# Patient Record
Sex: FEMALE | Race: BLACK OR AFRICAN AMERICAN | NOT HISPANIC OR LATINO | Employment: FULL TIME | URBAN - METROPOLITAN AREA
[De-identification: names, ages, dates, MRNs, and addresses within clinical notes are randomized per-mention and may not be internally consistent; named-entity substitution may affect disease eponyms.]

---

## 2022-11-09 ENCOUNTER — APPOINTMENT (OUTPATIENT)
Dept: GENERAL RADIOLOGY | Facility: CLINIC | Age: 27
End: 2022-11-09
Attending: EMERGENCY MEDICINE
Payer: COMMERCIAL

## 2022-11-09 ENCOUNTER — HOSPITAL ENCOUNTER (OUTPATIENT)
Facility: CLINIC | Age: 27
Setting detail: OBSERVATION
Discharge: HOME | End: 2022-11-12
Attending: EMERGENCY MEDICINE | Admitting: HOSPITALIST
Payer: COMMERCIAL

## 2022-11-09 DIAGNOSIS — J10.1 INFLUENZA A: ICD-10-CM

## 2022-11-09 DIAGNOSIS — I51.4 MYOCARDITIS, UNSPECIFIED CHRONICITY, UNSPECIFIED MYOCARDITIS TYPE (H): ICD-10-CM

## 2022-11-09 LAB
ALBUMIN SERPL-MCNC: 3.2 G/DL (ref 3.4–5)
ALP SERPL-CCNC: 57 U/L (ref 40–150)
ALT SERPL W P-5'-P-CCNC: 26 U/L (ref 0–50)
ANION GAP SERPL CALCULATED.3IONS-SCNC: 9 MMOL/L (ref 3–14)
AST SERPL W P-5'-P-CCNC: 37 U/L (ref 0–45)
ATRIAL RATE - MUSE: 83 BPM
BASOPHILS # BLD AUTO: 0 10E3/UL (ref 0–0.2)
BASOPHILS NFR BLD AUTO: 0 %
BILIRUB SERPL-MCNC: 0.9 MG/DL (ref 0.2–1.3)
BUN SERPL-MCNC: 17 MG/DL (ref 7–30)
CALCIUM SERPL-MCNC: 8.8 MG/DL (ref 8.5–10.1)
CHLORIDE BLD-SCNC: 100 MMOL/L (ref 94–109)
CO2 SERPL-SCNC: 22 MMOL/L (ref 20–32)
CREAT SERPL-MCNC: 0.96 MG/DL (ref 0.52–1.04)
DIASTOLIC BLOOD PRESSURE - MUSE: NORMAL MMHG
EOSINOPHIL # BLD AUTO: 0 10E3/UL (ref 0–0.7)
EOSINOPHIL NFR BLD AUTO: 0 %
ERYTHROCYTE [DISTWIDTH] IN BLOOD BY AUTOMATED COUNT: 14.6 % (ref 10–15)
FLUAV RNA SPEC QL NAA+PROBE: POSITIVE
FLUBV RNA RESP QL NAA+PROBE: NEGATIVE
GFR SERPL CREATININE-BSD FRML MDRD: 83 ML/MIN/1.73M2
GLUCOSE BLD-MCNC: 113 MG/DL (ref 70–99)
HCT VFR BLD AUTO: 42.3 % (ref 35–47)
HGB BLD-MCNC: 14.1 G/DL (ref 11.7–15.7)
IMM GRANULOCYTES # BLD: 0 10E3/UL
IMM GRANULOCYTES NFR BLD: 0 %
INTERPRETATION ECG - MUSE: NORMAL
LYMPHOCYTES # BLD AUTO: 1.7 10E3/UL (ref 0.8–5.3)
LYMPHOCYTES NFR BLD AUTO: 21 %
MCH RBC QN AUTO: 26.9 PG (ref 26.5–33)
MCHC RBC AUTO-ENTMCNC: 33.3 G/DL (ref 31.5–36.5)
MCV RBC AUTO: 81 FL (ref 78–100)
MONOCYTES # BLD AUTO: 0.6 10E3/UL (ref 0–1.3)
MONOCYTES NFR BLD AUTO: 7 %
NEUTROPHILS # BLD AUTO: 5.6 10E3/UL (ref 1.6–8.3)
NEUTROPHILS NFR BLD AUTO: 72 %
NRBC # BLD AUTO: 0 10E3/UL
NRBC BLD AUTO-RTO: 0 /100
NT-PROBNP SERPL-MCNC: 36 PG/ML (ref 0–450)
P AXIS - MUSE: 56 DEGREES
PLATELET # BLD AUTO: 270 10E3/UL (ref 150–450)
POTASSIUM BLD-SCNC: 3.8 MMOL/L (ref 3.4–5.3)
PR INTERVAL - MUSE: 172 MS
PROT SERPL-MCNC: 7.8 G/DL (ref 6.8–8.8)
QRS DURATION - MUSE: 84 MS
QT - MUSE: 392 MS
QTC - MUSE: 460 MS
R AXIS - MUSE: 75 DEGREES
RBC # BLD AUTO: 5.25 10E6/UL (ref 3.8–5.2)
RSV RNA SPEC NAA+PROBE: NEGATIVE
SARS-COV-2 RNA RESP QL NAA+PROBE: NEGATIVE
SODIUM SERPL-SCNC: 131 MMOL/L (ref 133–144)
SYSTOLIC BLOOD PRESSURE - MUSE: NORMAL MMHG
T AXIS - MUSE: -20 DEGREES
TROPONIN I SERPL HS-MCNC: 73 NG/L
TROPONIN I SERPL HS-MCNC: 88 NG/L
VENTRICULAR RATE- MUSE: 83 BPM
WBC # BLD AUTO: 7.8 10E3/UL (ref 4–11)

## 2022-11-09 PROCEDURE — 84484 ASSAY OF TROPONIN QUANT: CPT | Performed by: EMERGENCY MEDICINE

## 2022-11-09 PROCEDURE — C9803 HOPD COVID-19 SPEC COLLECT: HCPCS

## 2022-11-09 PROCEDURE — 83880 ASSAY OF NATRIURETIC PEPTIDE: CPT | Performed by: EMERGENCY MEDICINE

## 2022-11-09 PROCEDURE — G0378 HOSPITAL OBSERVATION PER HR: HCPCS

## 2022-11-09 PROCEDURE — 96361 HYDRATE IV INFUSION ADD-ON: CPT

## 2022-11-09 PROCEDURE — 250N000011 HC RX IP 250 OP 636: Performed by: EMERGENCY MEDICINE

## 2022-11-09 PROCEDURE — 250N000013 HC RX MED GY IP 250 OP 250 PS 637: Performed by: EMERGENCY MEDICINE

## 2022-11-09 PROCEDURE — 96374 THER/PROPH/DIAG INJ IV PUSH: CPT

## 2022-11-09 PROCEDURE — 36415 COLL VENOUS BLD VENIPUNCTURE: CPT | Performed by: EMERGENCY MEDICINE

## 2022-11-09 PROCEDURE — 71046 X-RAY EXAM CHEST 2 VIEWS: CPT

## 2022-11-09 PROCEDURE — 87637 SARSCOV2&INF A&B&RSV AMP PRB: CPT | Performed by: EMERGENCY MEDICINE

## 2022-11-09 PROCEDURE — 80053 COMPREHEN METABOLIC PANEL: CPT | Performed by: EMERGENCY MEDICINE

## 2022-11-09 PROCEDURE — 99285 EMERGENCY DEPT VISIT HI MDM: CPT | Mod: 25

## 2022-11-09 PROCEDURE — 99220 PR INITIAL OBSERVATION CARE,LEVEL III: CPT | Performed by: HOSPITALIST

## 2022-11-09 PROCEDURE — 85025 COMPLETE CBC W/AUTO DIFF WBC: CPT | Performed by: EMERGENCY MEDICINE

## 2022-11-09 PROCEDURE — 93005 ELECTROCARDIOGRAM TRACING: CPT

## 2022-11-09 PROCEDURE — 258N000003 HC RX IP 258 OP 636: Performed by: EMERGENCY MEDICINE

## 2022-11-09 RX ORDER — ASPIRIN 81 MG/1
162 TABLET, CHEWABLE ORAL ONCE
Status: COMPLETED | OUTPATIENT
Start: 2022-11-09 | End: 2022-11-09

## 2022-11-09 RX ORDER — SODIUM CHLORIDE 9 MG/ML
INJECTION, SOLUTION INTRAVENOUS CONTINUOUS
Status: DISCONTINUED | OUTPATIENT
Start: 2022-11-09 | End: 2022-11-12 | Stop reason: HOSPADM

## 2022-11-09 RX ORDER — ONDANSETRON 2 MG/ML
4 INJECTION INTRAMUSCULAR; INTRAVENOUS ONCE
Status: COMPLETED | OUTPATIENT
Start: 2022-11-09 | End: 2022-11-09

## 2022-11-09 RX ORDER — ACETAMINOPHEN 500 MG
1000 TABLET ORAL ONCE
Status: COMPLETED | OUTPATIENT
Start: 2022-11-09 | End: 2022-11-09

## 2022-11-09 RX ADMIN — ASPIRIN 81 MG CHEWABLE TABLET 162 MG: 81 TABLET CHEWABLE at 21:21

## 2022-11-09 RX ADMIN — ACETAMINOPHEN 1000 MG: 500 TABLET ORAL at 19:50

## 2022-11-09 RX ADMIN — ONDANSETRON 4 MG: 2 INJECTION INTRAMUSCULAR; INTRAVENOUS at 19:50

## 2022-11-09 RX ADMIN — SODIUM CHLORIDE 125 ML/HR: 9 INJECTION, SOLUTION INTRAVENOUS at 21:27

## 2022-11-09 RX ADMIN — SODIUM CHLORIDE 1000 ML: 9 INJECTION, SOLUTION INTRAVENOUS at 19:45

## 2022-11-09 ASSESSMENT — ACTIVITIES OF DAILY LIVING (ADL): ADLS_ACUITY_SCORE: 35

## 2022-11-10 ENCOUNTER — APPOINTMENT (OUTPATIENT)
Dept: CARDIOLOGY | Facility: CLINIC | Age: 27
End: 2022-11-10
Attending: HOSPITALIST
Payer: COMMERCIAL

## 2022-11-10 LAB
ANION GAP SERPL CALCULATED.3IONS-SCNC: 5 MMOL/L (ref 3–14)
BUN SERPL-MCNC: 12 MG/DL (ref 7–30)
CALCIUM SERPL-MCNC: 7.8 MG/DL (ref 8.5–10.1)
CHLORIDE BLD-SCNC: 110 MMOL/L (ref 94–109)
CO2 SERPL-SCNC: 23 MMOL/L (ref 20–32)
CREAT SERPL-MCNC: 0.8 MG/DL (ref 0.52–1.04)
ERYTHROCYTE [DISTWIDTH] IN BLOOD BY AUTOMATED COUNT: 14.8 % (ref 10–15)
GFR SERPL CREATININE-BSD FRML MDRD: >90 ML/MIN/1.73M2
GLUCOSE BLD-MCNC: 92 MG/DL (ref 70–99)
HCG UR QL: NEGATIVE
HCT VFR BLD AUTO: 34.3 % (ref 35–47)
HGB BLD-MCNC: 11.6 G/DL (ref 11.7–15.7)
LVEF ECHO: NORMAL
MCH RBC QN AUTO: 27.4 PG (ref 26.5–33)
MCHC RBC AUTO-ENTMCNC: 33.8 G/DL (ref 31.5–36.5)
MCV RBC AUTO: 81 FL (ref 78–100)
PLATELET # BLD AUTO: 180 10E3/UL (ref 150–450)
POTASSIUM BLD-SCNC: 3.7 MMOL/L (ref 3.4–5.3)
RBC # BLD AUTO: 4.24 10E6/UL (ref 3.8–5.2)
SODIUM SERPL-SCNC: 138 MMOL/L (ref 133–144)
TROPONIN I SERPL HS-MCNC: 51 NG/L
WBC # BLD AUTO: 6.8 10E3/UL (ref 4–11)

## 2022-11-10 PROCEDURE — G0378 HOSPITAL OBSERVATION PER HR: HCPCS

## 2022-11-10 PROCEDURE — 250N000009 HC RX 250: Performed by: HOSPITALIST

## 2022-11-10 PROCEDURE — 81025 URINE PREGNANCY TEST: CPT | Performed by: HOSPITALIST

## 2022-11-10 PROCEDURE — 999N000208 ECHOCARDIOGRAM COMPLETE

## 2022-11-10 PROCEDURE — 93306 TTE W/DOPPLER COMPLETE: CPT | Mod: 26 | Performed by: INTERNAL MEDICINE

## 2022-11-10 PROCEDURE — 82310 ASSAY OF CALCIUM: CPT | Performed by: HOSPITALIST

## 2022-11-10 PROCEDURE — 255N000002 HC RX 255 OP 636: Performed by: EMERGENCY MEDICINE

## 2022-11-10 PROCEDURE — 36415 COLL VENOUS BLD VENIPUNCTURE: CPT | Performed by: HOSPITALIST

## 2022-11-10 PROCEDURE — 85027 COMPLETE CBC AUTOMATED: CPT | Performed by: HOSPITALIST

## 2022-11-10 PROCEDURE — 250N000013 HC RX MED GY IP 250 OP 250 PS 637: Performed by: HOSPITALIST

## 2022-11-10 PROCEDURE — 99204 OFFICE O/P NEW MOD 45 MIN: CPT | Mod: 25 | Performed by: INTERNAL MEDICINE

## 2022-11-10 PROCEDURE — 99225 PR SUBSEQUENT OBSERVATION CARE,LEVEL II: CPT | Performed by: HOSPITALIST

## 2022-11-10 PROCEDURE — 84484 ASSAY OF TROPONIN QUANT: CPT | Performed by: HOSPITALIST

## 2022-11-10 PROCEDURE — 258N000003 HC RX IP 258 OP 636: Performed by: EMERGENCY MEDICINE

## 2022-11-10 RX ORDER — ONDANSETRON 2 MG/ML
4 INJECTION INTRAMUSCULAR; INTRAVENOUS EVERY 6 HOURS PRN
Status: DISCONTINUED | OUTPATIENT
Start: 2022-11-10 | End: 2022-11-12 | Stop reason: HOSPADM

## 2022-11-10 RX ORDER — ACETAMINOPHEN 650 MG/1
650 SUPPOSITORY RECTAL EVERY 6 HOURS PRN
Status: DISCONTINUED | OUTPATIENT
Start: 2022-11-10 | End: 2022-11-12 | Stop reason: HOSPADM

## 2022-11-10 RX ORDER — LANOLIN ALCOHOL/MO/W.PET/CERES
3 CREAM (GRAM) TOPICAL
Status: DISCONTINUED | OUTPATIENT
Start: 2022-11-10 | End: 2022-11-12 | Stop reason: HOSPADM

## 2022-11-10 RX ORDER — OSELTAMIVIR PHOSPHATE 75 MG/1
75 CAPSULE ORAL 2 TIMES DAILY
Status: DISCONTINUED | OUTPATIENT
Start: 2022-11-10 | End: 2022-11-12 | Stop reason: HOSPADM

## 2022-11-10 RX ORDER — ONDANSETRON 4 MG/1
4 TABLET, ORALLY DISINTEGRATING ORAL EVERY 6 HOURS PRN
Status: DISCONTINUED | OUTPATIENT
Start: 2022-11-10 | End: 2022-11-12 | Stop reason: HOSPADM

## 2022-11-10 RX ORDER — IPRATROPIUM BROMIDE AND ALBUTEROL SULFATE 2.5; .5 MG/3ML; MG/3ML
3 SOLUTION RESPIRATORY (INHALATION)
Status: DISCONTINUED | OUTPATIENT
Start: 2022-11-10 | End: 2022-11-12 | Stop reason: HOSPADM

## 2022-11-10 RX ORDER — CODEINE PHOSPHATE AND GUAIFENESIN 10; 100 MG/5ML; MG/5ML
5 SOLUTION ORAL EVERY 4 HOURS PRN
Status: DISCONTINUED | OUTPATIENT
Start: 2022-11-10 | End: 2022-11-12 | Stop reason: HOSPADM

## 2022-11-10 RX ORDER — ACETAMINOPHEN 325 MG/1
650 TABLET ORAL EVERY 6 HOURS PRN
Status: DISCONTINUED | OUTPATIENT
Start: 2022-11-10 | End: 2022-11-12 | Stop reason: HOSPADM

## 2022-11-10 RX ADMIN — IPRATROPIUM BROMIDE AND ALBUTEROL SULFATE 3 ML: .5; 3 SOLUTION RESPIRATORY (INHALATION) at 12:48

## 2022-11-10 RX ADMIN — OSELTAMIVIR PHOSPHATE 75 MG: 75 CAPSULE ORAL at 08:07

## 2022-11-10 RX ADMIN — ACETAMINOPHEN 650 MG: 325 TABLET ORAL at 14:05

## 2022-11-10 RX ADMIN — GUAIFENESIN AND CODEINE PHOSPHATE 5 ML: 100; 10 SOLUTION ORAL at 20:16

## 2022-11-10 RX ADMIN — GUAIFENESIN AND CODEINE PHOSPHATE 5 ML: 100; 10 SOLUTION ORAL at 14:05

## 2022-11-10 RX ADMIN — IPRATROPIUM BROMIDE AND ALBUTEROL SULFATE 3 ML: .5; 3 SOLUTION RESPIRATORY (INHALATION) at 07:56

## 2022-11-10 RX ADMIN — OSELTAMIVIR PHOSPHATE 75 MG: 75 CAPSULE ORAL at 20:16

## 2022-11-10 RX ADMIN — SODIUM CHLORIDE: 9 INJECTION, SOLUTION INTRAVENOUS at 01:47

## 2022-11-10 RX ADMIN — IPRATROPIUM BROMIDE AND ALBUTEROL SULFATE 3 ML: .5; 3 SOLUTION RESPIRATORY (INHALATION) at 21:32

## 2022-11-10 RX ADMIN — IPRATROPIUM BROMIDE AND ALBUTEROL SULFATE 3 ML: .5; 3 SOLUTION RESPIRATORY (INHALATION) at 17:52

## 2022-11-10 RX ADMIN — ACETAMINOPHEN 650 MG: 325 TABLET ORAL at 20:16

## 2022-11-10 RX ADMIN — HUMAN ALBUMIN MICROSPHERES AND PERFLUTREN 3 ML: 10; .22 INJECTION, SOLUTION INTRAVENOUS at 11:19

## 2022-11-10 ASSESSMENT — ACTIVITIES OF DAILY LIVING (ADL)
ADLS_ACUITY_SCORE: 33
ADLS_ACUITY_SCORE: 35
ADLS_ACUITY_SCORE: 35
ADLS_ACUITY_SCORE: 33
ADLS_ACUITY_SCORE: 35
ADLS_ACUITY_SCORE: 33
ADLS_ACUITY_SCORE: 33
ADLS_ACUITY_SCORE: 35
ADLS_ACUITY_SCORE: 33
ADLS_ACUITY_SCORE: 35

## 2022-11-10 NOTE — ED PROVIDER NOTES
Rapid Assessment Note    History:   Jerman Toussaint is a 27 year old female who presents with 3 to 4 days of high fevers, cough and body aches.  Was seen in urgent care, was tested for COVID, and sent home.  Her kids have similar symptoms.  Notes continued cough and rib pain, was told to come by triage line.    Exam:   General:  Alert, interactive  Cardiovascular:  Well perfused  Lungs:  No respiratory distress, no accessory muscle use  Neuro:  Moving all 4 extremities  Skin:  Warm, dry  Psych:  Normal affect      Plan of Care:   I evaluated the patient and developed an initial plan of care. I discussed this plan and explained that I, or one of my partners, would be returning to complete the evaluation.         I, Rupert Villanueva MD, am serving as a scribe to document services personally performed by No att. providers found, based on my observations and the provider's statements to me.    11/9/2022  [employer] [employer]    Portions of this medical record were completed by a scribe. UPON MY REVIEW AND AUTHENTICATION BY ELECTRONIC SIGNATURE, this confirms (a) I performed the applicable clinical services, and (b) the record is accurate.        Rupert Villanueva MD  11/09/22 1938

## 2022-11-10 NOTE — PROGRESS NOTES
[facility]    Medicine Progress Note - Hospitalist Service    Date of Admission:  11/9/2022    Assessment & Plan     Jerman Toussaint is a 27 year old female with a past medical history of asthma presents to hospital with palpitations     Influenza A  Patient reports symptoms started on Sunday.  She was febrile in the ER.  Excision saturation stable on room air.  Given her asthma and now myocarditis I will treat her with tamiflu.  - Tylenol as needed.  Avoid NSAIDs due to myocarditis.  - Oseltamivir 75bid for 5 days  - PRN Antitussives.     Probable Myocarditis  Patient reporting palpitations and symptoms of heart racing with minimal exertion and when lying down flat.  Denies any lower extremity swelling.  EKG with diffuse T wave inversions.  Troponin I initially elevated to 88 and repeat came back at 73.  In setting of viral infection myocarditis is suspected.  Case was discussed with cardiology by the ER provider and she will be admitted for echocardiogram and cardiology evaluation.  On exam and based on BMP the patient is not in heart failure.   - cardiology consult; input appreciated.  - contiunue to monitor on telemetry  - echocardiogram - preserved EF 60 - 65%, no WMA, ventricular diastolic function is  Abnormal  -Cardiac MRI pending.     Hyponatremia - resolved  Mild.  Likely secondary to acute illness and poor p.o. intake.  -Monitor     Diet: Regular Diet Adult    DVT Prophylaxis: Pneumatic Compression Devices  Daniel Catheter: Not present  Central Lines: None  Cardiac Monitoring: None  Code Status: Full Code      Disposition Plan      Expected Discharge Date: 11/11/2022                The patient's care was discussed with the Patient.    Leti Araya MD  Hospitalist Service  [facility]  Securely message with the Vocera Web Console (learn more here)  Text page via Maintenance Assistant Paging/Directory         Clinically Significant Risk Factors Present on Admission          # Hyponatremia: Lowest Na = 131 mmol/L (Ref range: 136-145) in last 2 days, will monitor as appropriate  # Hypocalcemia: Lowest Ca = 7.8 mg/dL (Ref range: 8.5 - 10.1 mg/dL) in last 2 days, will monitor and replace as appropriate     # Hypoalbuminemia: Lowest albumin = 3.2 g/dL (Ref range: 3.5-5.2) at 11/9/2022  7:44 PM, will monitor as appropriate                 ______________________________________________________________________    Interval History   Patient sitting on the edge of the bed, coughing continuously.  Not able to speak much.        Data reviewed today: I reviewed all medications, new labs and imaging results over the last 24 hours. I personally reviewed no images or EKG's today.    Physical Exam   Vital Signs: Temp: (!) 100.6  F (38.1  C) Temp src: Oral BP: 106/75 Pulse: 71   Resp: 20 SpO2: 97 % O2 Device: None (Room air)    Weight: 200 lbs 0 oz  General Appearance: Well appearing for stated age.  Respiratory: CTAB, no rales or ronchi  Cardiovascular: S1, S2 normal, no murmurs  GI: non-tender on palpation, BS present      Data   Recent Labs   Lab 11/10/22  0618 11/09/22  1944   WBC 6.8 7.8   HGB 11.6* 14.1   MCV 81 81    270    131*   POTASSIUM 3.7 3.8   CHLORIDE 110* 100   CO2 23 22   BUN 12 17   CR 0.80 0.96   ANIONGAP 5 9   NINO 7.8* 8.8   GLC 92 113*   ALBUMIN  --  3.2*   PROTTOTAL  --  7.8   BILITOTAL  --  0.9   ALKPHOS  --  57   ALT  --  26   AST  --  37     Recent Results (from the past 24 hour(s))   XR Chest 2 Views    Narrative    EXAM: XR CHEST 2 VIEWS  LOCATION: [facility]  DATE/TIME: 11/9/2022 8:48 PM    INDICATION: Shortness of breath.  COMPARISON: None.      Impression    IMPRESSION: Negative chest.   Echocardiogram Complete   Result Value    LVEF  60-65%    Narrative    [id number]  [id number]  [id number]^EDVIN^MARI^HEMZBIGNIEW     [facility]  Echocardiography Laboratory  [address] [place] [id number]     Name:  PJ LAO  MRN: [id number]  : 1995  Study Date: 11/10/2022 10:48 AM  Age: 27 yrs  Gender: Female  Patient Location: [facility]  Reason For Study: Myocarditis  Ordering Physician: MARI PARDO  Referring Physician: MARI PARDO  Performed By: JAVIER Guevara     BSA: 2.1 m2  Height: 71 in  Weight: 200 lb  HR: 75  BP: 106/75 mmHg  ______________________________________________________________________________  Procedure  Complete Portable Echo Adult. Optison (NDC #[id number]) given intravenously.  ______________________________________________________________________________  Interpretation Summary     Left ventricular systolic function is normal.  The visual ejection fraction is 60-65%.  Normal left ventricular wall motion  The right ventricle is normal in structure, function and size.  No significant valve dysfunction.  The inferior vena cava was normal in size with preserved respiratory  variability.  There is no pericardial effusion.     No prior study for comparison.  ______________________________________________________________________________  Left Ventricle  The left ventricle is normal in structure, function and size. There is normal  left ventricular wall thickness. Left ventricular systolic function is normal.  The visual ejection fraction is 60-65%. Left ventricular diastolic function is  abnormal. Normal left ventricular wall motion.     Right Ventricle  The right ventricle is normal in structure, function and size.     Atria  Normal left atrial size. Right atrial size is normal.     Mitral Valve  The mitral valve is normal in structure and function.     Tricuspid Valve  The tricuspid valve is normal in structure and function. Right ventricular  systolic pressure could not be approximated due to inadequate tricuspid  regurgitation.     Aortic Valve  The aortic valve is normal in structure and function.     Pulmonic Valve  The pulmonic valve is normal in structure and  function.     Vessels  The aortic root is normal size. Normal size ascending aorta. The inferior vena  cava was normal in size with preserved respiratory variability.     Pericardium  There is no pericardial effusion.     ______________________________________________________________________________  MMode/2D Measurements & Calculations  IVSd: 0.97 cm  LVIDd: 4.7 cm  LVIDs: 3.0 cm  LVPWd: 0.74 cm  FS: 35.7 %  LV mass(C)d: 133.8 grams  LV mass(C)dI: 63.4 grams/m2     Ao root diam: 3.2 cm  asc Aorta Diam: 3.5 cm  LVOT diam: 1.9 cm  LVOT area: 2.9 cm2  LA Volume (BP): 31.2 ml  LA Volume Index (BP): 14.8 ml/m2     LA Volume Indexed (AL/bp): 15.1 ml/m2  RWT: 0.32     Doppler Measurements & Calculations  MV E max bogdan: 88.6 cm/sec  MV A max bogdan: 87.5 cm/sec  MV E/A: 1.0  MV dec time: 0.21 sec  PA acc time: 0.14 sec  E/E' av.1  Lateral E/e': 10.1  Medial E/e': 8.1     ______________________________________________________________________________  Report approved by: Aguilar Brown 11/10/2022 11:53 AM

## 2022-11-10 NOTE — ED NOTES
Ed   [facility]  ED Nurse Handoff Report    ED Chief complaint: Palpitations and Shortness of Breath      ED Diagnosis: myocarditis  Final diagnoses:   Myocarditis, unspecified chronicity, unspecified myocarditis type (H)   Influenza A       Code Status: Discuss with admit MD    Allergies: No Known Allergies    Patient Story: Jerman Toussaint is a 27 year old female who presents with 3 to 4 days of high fevers, cough and body aches.  Was seen in urgent care, was tested for COVID, and sent home.  Her kids have similar symptoms.  Notes continued cough and rib pain, was told to come by triage line.  Focused Assessment:  gcs 15 alert oriented no headache   Lungs: sob on exertion and whezzes throughout  Cvs: s1s2 twi globally on ekg  Abdo: previous nvd none now no abdo pain  Msk: c/o sharp bilateal side pain    Treatments and/or interventions provided:   Results for orders placed or performed during the hospital encounter of 11/09/22   XR Chest 2 Views     Status: None    Narrative    EXAM: XR CHEST 2 VIEWS  LOCATION: [facility]  DATE/TIME: 11/9/2022 8:48 PM    INDICATION: Shortness of breath.  COMPARISON: None.      Impression    IMPRESSION: Negative chest.   Comprehensive metabolic panel     Status: Abnormal   Result Value Ref Range    Sodium 131 (L) 133 - 144 mmol/L    Potassium 3.8 3.4 - 5.3 mmol/L    Chloride 100 94 - 109 mmol/L    Carbon Dioxide (CO2) 22 20 - 32 mmol/L    Anion Gap 9 3 - 14 mmol/L    Urea Nitrogen 17 7 - 30 mg/dL    Creatinine 0.96 0.52 - 1.04 mg/dL    Calcium 8.8 8.5 - 10.1 mg/dL    Glucose 113 (H) 70 - 99 mg/dL    Alkaline Phosphatase 57 40 - 150 U/L    AST 37 0 - 45 U/L    ALT 26 0 - 50 U/L    Protein Total 7.8 6.8 - 8.8 g/dL    Albumin 3.2 (L) 3.4 - 5.0 g/dL    Bilirubin Total 0.9 0.2 - 1.3 mg/dL    GFR Estimate 83 >60 mL/min/1.73m2   Troponin I     Status: Abnormal   Result Value Ref Range    Troponin I High Sensitivity 88 (H) <54 ng/L    Symptomatic; Unknown Influenza A/B & SARS-CoV2 (COVID-19) Virus PCR Multiplex Nasopharyngeal     Status: Abnormal    Specimen: Nasopharyngeal; Swab   Result Value Ref Range    Influenza A PCR Positive (A) Negative    Influenza B PCR Negative Negative    RSV PCR Negative Negative    SARS CoV2 PCR Negative Negative    Narrative    Testing was performed using the Xpert Xpress CoV2/Flu/RSV Assay on the [employer] GeneXpert Instrument. This test should be ordered for the detection of SARS-CoV-2 and influenza viruses in individuals who meet clinical and/or epidemiological criteria. Test performance is unknown in asymptomatic patients. This test is for in vitro diagnostic use under the FDA EUA for laboratories certified under CLIA to perform high or moderate complexity testing. This test has not been FDA cleared or approved. A negative result does not rule out the presence of PCR inhibitors in the specimen or target RNA in concentration below the limit of detection for the assay. If only one viral target is positive but coinfection with multiple targets is suspected, the sample should be re-tested with another FDA cleared, approved, or authorized test, if coinfection would change clinical management. This test was validated by the [facility] [employer]. These laboratories are certified under the Clinical Laboratory Improvement Amendments of 1988 (CLIA-88) as qualified to perform high complexity laboratory testing.   CBC with platelets and differential     Status: Abnormal   Result Value Ref Range    WBC Count 7.8 4.0 - 11.0 10e3/uL    RBC Count 5.25 (H) 3.80 - 5.20 10e6/uL    Hemoglobin 14.1 11.7 - 15.7 g/dL    Hematocrit 42.3 35.0 - 47.0 %    MCV 81 78 - 100 fL    MCH 26.9 26.5 - 33.0 pg    MCHC 33.3 31.5 - 36.5 g/dL    RDW 14.6 10.0 - 15.0 %    Platelet Count 270 150 - 450 10e3/uL    % Neutrophils 72 %    % Lymphocytes 21 %    % Monocytes 7 %    % Eosinophils 0 %    % Basophils 0 %    % Immature Granulocytes 0 %     NRBCs per 100 WBC 0 <1 /100    Absolute Neutrophils 5.6 1.6 - 8.3 10e3/uL    Absolute Lymphocytes 1.7 0.8 - 5.3 10e3/uL    Absolute Monocytes 0.6 0.0 - 1.3 10e3/uL    Absolute Eosinophils 0.0 0.0 - 0.7 10e3/uL    Absolute Basophils 0.0 0.0 - 0.2 10e3/uL    Absolute Immature Granulocytes 0.0 <=0.4 10e3/uL    Absolute NRBCs 0.0 10e3/uL   Troponin I     Status: Abnormal   Result Value Ref Range    Troponin I High Sensitivity 73 (H) <54 ng/L   EKG 12-lead, tracing only     Status: None (Preliminary result)   Result Value Ref Range    Systolic Blood Pressure  mmHg    Diastolic Blood Pressure  mmHg    Ventricular Rate 83 BPM    Atrial Rate 83 BPM    NJ Interval 172 ms    QRS Duration 84 ms     ms    QTc 460 ms    P Axis 56 degrees    R AXIS 75 degrees    T Axis -20 degrees    Interpretation ECG       Sinus rhythm with sinus arrhythmia  T wave abnormality, consider inferior ischemia  T wave abnormality, consider anterolateral ischemia  Prolonged QT  Abnormal ECG  No previous ECGs available     CBC with platelets differential     Status: Abnormal    Narrative    The following orders were created for panel order CBC with platelets differential.  Procedure                               Abnormality         Status                     ---------                               -----------         ------                     CBC with platelets and d...[[id number]]  Abnormal            Final result                 Please view results for these tests on the individual orders.     Patient's response to treatments and/or interventions: telemetry serial trops    To be done/followed up on inpatient unit:  serial trops and cp    Does this patient have any cognitive concerns?: denies    Activity level - Baseline/Home:  Independent  Activity Level - Current:   Independent    Patient's Preferred language: English   Needed?: No    Isolation: None  Infection: Not Applicable  Patient tested for COVID 19 prior to admission:  YES  Bariatric?: No    Vital Signs:   Vitals:    11/09/22 2115 11/09/22 2121 11/09/22 2130 11/09/22 2145   BP: 116/77  115/71    Pulse:  92 89 87   Resp:   23 24   Temp:  (!) 100.6  F (38.1  C)     TempSrc:       SpO2:   97% 98%   Weight:           Cardiac Rhythm:     Was the PSS-3 completed:   Yes  What interventions are required if any?               Family Comments: na  OBS brochure/video discussed/provided to patient/family: Yes              Name of person given brochure if not patient: na              Relationship to patient: na    For the majority of the shift this patient's behavior was Green.   Behavioral interventions performed were na.    ED NURSE PHONE NUMBER: [phone]

## 2022-11-10 NOTE — CONSULTS
CARDIOLOGY CONSULT    REASON FOR CONSULT: positive troponin     PRIMARY CARE PHYSICIAN:  Physician No Ref-Primary    HISTORY OF PRESENT ILLNESS:  Ms. Jerman Toussaint is a 27 year old woman with PMH significant for asthma who presents for evaluation of tachycardia, fever, fatigue.  Jerman states that she has been feeling poorly since Sunday with nausea/vomiting and diarrhea as well as generalized fatigue, body aches, shortness of breath, cough.  She states over the past couple of days she has noted tachycardia with minimal activity and when laying down.  She notes significant fatigue with activity and some shortness of breath.  She has a cough.  She initially went to urgent care and tested positive for influemza A.  Due to ongoing concerns regarding tachycardia she presented to the ED.  She was noted to be febrile. ECG demonstrates sinus tachycardia with T wave inversions in the anterolateral leads.  Her troponin was minimally positive at 88 and then 73.           PAST MEDICAL HISTORY:  1.  Asthma    MEDICATIONS:  Current Facility-Administered Medications   Medication     acetaminophen (TYLENOL) tablet 650 mg    Or     acetaminophen (TYLENOL) Suppository 650 mg     ipratropium - albuterol 0.5 mg/2.5 mg/3 mL (DUONEB) neb solution 3 mL     melatonin tablet 3 mg     ondansetron (ZOFRAN ODT) ODT tab 4 mg    Or     ondansetron (ZOFRAN) injection 4 mg     oseltamivir (TAMIFLU) capsule 75 mg     sodium chloride (PF) 0.9% PF flush 10 mL     sodium chloride 0.9% infusion       ALLERGIES:  No Known Allergies    SOCIAL HISTORY:  I have reviewed this patient's social history and updated it with pertinent information if needed. Jerman Toussaint  reports that she does not currently use alcohol.    FAMILY HISTORY:  I have reviewed this patient's family history and updated it with pertinent information if needed.   Family History   Problem Relation Age of Onset     Hypertension Mother      Asthma Mother      Hypertension  Father      Heart Disease Maternal Grandmother      Coronary Artery Disease Maternal Grandfather        REVIEW OF SYSTEMS:  A complete ROS was obtained and the pertinent positives are outlined in the history of present illness above.  The remainder of systems is negative.      PHYSICAL EXAM:  Temp: (!) 100.6  F (38.1  C) Temp src: Oral BP: 106/75 Pulse: 71   Resp: 20 SpO2: 97 % O2 Device: None (Room air)    Vital Signs with Ranges  Temp:  [100.6  F (38.1  C)-103.1  F (39.5  C)] 100.6  F (38.1  C)  Pulse:  [] 71  Resp:  [18-24] 20  BP: (106-128)/(71-84) 106/75  SpO2:  [96 %-98 %] 97 %  200 lbs 0 oz    Constitutional: awake, alert, no distress  Eyes: PERRL, sclera nonicteric  ENT: trachea midline  Respiratory: CTAB  Cardiovascular: RRR no m/r/g, no JVD  GI: nondistended, nontender, bowel sounds present  Lymph/Hematologic: no lymphadenopathy  Skin: dry, no rash  Musculoskeletal: good muscle tone, no edema bilaterally  Neurologic: no focal deficits  Neuropsychiatric: appropriate affact    DATA:  Labs:   Troponin 88-73    EKG: Dated 11/9/22 reviewed personally.  Sinus rhythm with diffuse T wave inversions    Echocardiogram dated 11/10/22 images reviewed personally   Left ventricular systolic function is normal.  The visual ejection fraction is 60-65%.  Normal left ventricular wall motion  The right ventricle is normal in structure, function and size.  No significant valve dysfunction.  The inferior vena cava was normal in size with preserved respiratory  variability.  There is no pericardial effusion.       ASSESSMENT:  1.  Influenza A  2.  Viral myocarditis:  Very mildly positive troponin, ECG changes.  Normal LV function by echocardiogram and no regional wall motion abnormalities  3.  Hyponatremia:  Secondary to poor PO intake    RECOMMENDATIONS:  1. Continue supportive cares  2.  Plan for cardiac MRI for further assessment of myocarditis    Pauly Alexis MD [facility]  November 10,  2022        Clinically Significant Risk Factors Present on Admission        # Hyponatremia: Lowest Na = 131 mmol/L (Ref range: 136-145) in last 2 days, will monitor as appropriate  # Hypocalcemia: Lowest Ca = 7.8 mg/dL (Ref range: 8.5 - 10.1 mg/dL) in last 2 days, will monitor and replace as appropriate     # Hypoalbuminemia: Lowest albumin = 3.2 g/dL (Ref range: 3.5-5.2) at 11/9/2022  7:44 PM, will monitor as appropriate

## 2022-11-10 NOTE — PROGRESS NOTES
Observation goals  PRIOR TO DISCHARGE        Comments: -diagnostic tests and consults completed and resulted - not met  -vital signs normal or at patient baseline - not met, febrile  Nurse to notify provider when observation goals have been met and patient is ready for discharge.

## 2022-11-10 NOTE — ED PROVIDER NOTES
History     Chief Complaint:  Palpitations and Shortness of Breath       HPI   Jerman Toussaint is a 27 year old female who presents with 3 to 4 days of high fevers, cough and body aches.  Was seen in urgent care, was tested for COVID, and sent home.  Her kids have similar symptoms.  Notes continued cough and rib pain, was told to come by triage line.    She specifically denies any chest pain at rest, only when she is coughing.  No fainting, does endorse palpitations however, describes them as more of a normal rhythm but faster and stronger, denies skipped beats.    ROS:  Review of Systems   All other systems reviewed and are negative.       Allergies:  No Known Allergies     Medications:    No current outpatient medications on file.      Past Medical History:    No past medical history on file.    Past Surgical History:    No past surgical history on file.     Family History:    family history is not on file.    Social History:     PCP: No Ref-Primary, Physician     Physical Exam   Patient Vitals for the past 24 hrs:   BP Temp Temp src Pulse Resp SpO2 Weight   11/09/22 2145 -- -- -- 87 24 98 % --   11/09/22 2130 115/71 -- -- 89 23 97 % --   11/09/22 2121 -- (!) 100.6  F (38.1  C) -- 92 -- -- --   11/09/22 2115 116/77 -- -- -- -- -- --   11/09/22 1931 128/84 (!) 103.1  F (39.5  C) Oral 119 19 97 % 90.7 kg (200 lb)        Physical Exam  Vitals: reviewed by me  General: Pt seen on [facility], pleasant, cooperative, and alert to conversation, sick appearing, nontoxic.  Eyes: Tracking well, clear conjunctiva BL  ENT: MMM, midline trachea.   Lungs: No tachypnea, no accessory muscle use. No respiratory distress.  Lungs are clear to auscultation bilaterally  CV: Rate as above, normal S1-S2, no additional heart sounds noted  Abd: Soft, non tender, no guarding, no rebound. Non distended  MSK: no joint effusion.  No evidence of trauma  Skin: No rash  Neuro: Clear speech and no facial droop.  Psych: Not RIS, no e/o  AH/VH      Emergency Department Course   ECG:  ECG results from 11/09/22   EKG 12-lead, tracing only     Value    Systolic Blood Pressure     Diastolic Blood Pressure     Ventricular Rate 83    Atrial Rate 83    WV Interval 172    QRS Duration 84        QTc 460    P Axis 56    R AXIS 75    T Axis -20    Interpretation ECG      Sinus rhythm with sinus arrhythmia  T wave abnormality, consider inferior ischemia  T wave abnormality, consider anterolateral ischemia  Prolonged QT  Abnormal ECG  No previous ECGs available         Imaging:  XR Chest 2 Views   Final Result   IMPRESSION: Negative chest.           Laboratory:  Labs Ordered and Resulted from Time of ED Arrival to Time of ED Departure   COMPREHENSIVE METABOLIC PANEL - Abnormal       Result Value    Sodium 131 (*)     Potassium 3.8      Chloride 100      Carbon Dioxide (CO2) 22      Anion Gap 9      Urea Nitrogen 17      Creatinine 0.96      Calcium 8.8      Glucose 113 (*)     Alkaline Phosphatase 57      AST 37      ALT 26      Protein Total 7.8      Albumin 3.2 (*)     Bilirubin Total 0.9      GFR Estimate 83     TROPONIN I - Abnormal    Troponin I High Sensitivity 88 (*)    INFLUENZA A/B & SARS-COV2 PCR MULTIPLEX - Abnormal    Influenza A PCR Positive (*)     Influenza B PCR Negative      RSV PCR Negative      SARS CoV2 PCR Negative     CBC WITH PLATELETS AND DIFFERENTIAL - Abnormal    WBC Count 7.8      RBC Count 5.25 (*)     Hemoglobin 14.1      Hematocrit 42.3      MCV 81      MCH 26.9      MCHC 33.3      RDW 14.6      Platelet Count 270      % Neutrophils 72      % Lymphocytes 21      % Monocytes 7      % Eosinophils 0      % Basophils 0      % Immature Granulocytes 0      NRBCs per 100 WBC 0      Absolute Neutrophils 5.6      Absolute Lymphocytes 1.7      Absolute Monocytes 0.6      Absolute Eosinophils 0.0      Absolute Basophils 0.0      Absolute Immature Granulocytes 0.0      Absolute NRBCs 0.0     TROPONIN I - Abnormal    Troponin I High  Sensitivity 73 (*)    NT PROBNP INPATIENT        Emergency Department Course:    Reviewed:  I reviewed nursing notes, vitals and past medical history      Consults:   Dr Rubalcava of cardiology 10:11pm    Interventions:  Medications   0.9% sodium chloride BOLUS (0 mLs Intravenous Stopped 11/9/22 2127)     Followed by   sodium chloride 0.9% infusion (125 mL/hr Intravenous New Bag 11/9/22 2127)   acetaminophen (TYLENOL) tablet 1,000 mg (1,000 mg Oral Given 11/9/22 1950)   ondansetron (ZOFRAN) injection 4 mg (4 mg Intravenous Given 11/9/22 1950)   aspirin (ASA) chewable tablet 162 mg (162 mg Oral Given 11/9/22 2121)        Disposition:  The patient was admitted to the hospital under the care of Dr. Hill.     Impression & Plan      Medical Decision Making:  This is a very pleasant 27-year-old female who presents emergency room with appears to be an influenza case, which certainly would explain the fever and myalgias.  She denies any active chest pain, though her EKG does show some diffuse ST depressions and her troponin was elevated.  I do think this is consistent with a possible diagnosis of myocarditis, and I spoke to Dr. Rubalcava of the cardiology team who agrees.  We will plan to trend troponins, patient is hemodynamically stable at this point, and will be treated supportively.  No indication for antibiotics at this time or heparinization, will plan for careful monitoring and admission to the care of Dr. Hill and cardiology consult tomorrow morning    Diagnosis:    ICD-10-CM    1. Myocarditis, unspecified chronicity, unspecified myocarditis type (H)  I51.4       2. Influenza A  J10.1               11/9/2022   Rupert Villanueva*        Rupert Villanueva MD  11/09/22 0153

## 2022-11-10 NOTE — H&P
[facility]    History and Physical  Hospitalist     Date of Admission:  11/9/2022    Assessment & Plan   Jerman Toussaint is a 27 year old female with a past medical history of asthma presents to hospital with palpitations    Influenza A  Patient reports symptoms started on Sunday.  She was febrile in the ER.  Excision saturation stable on room air.  Given her asthma and now myocarditis I will treat her with tamiflu.  -Tylenol as needed.  Avoid NSAIDs due to myocarditis.  -Oseltamivir 75bid for 5 days    Myocarditis  Patient reporting palpitations and symptoms of heart racing with minimal exertion and when lying down flat.  Denies any lower extremity swelling.  EKG with diffuse T wave inversions.  Troponin I initially elevated to 88 and repeat came back at 73.  In setting of viral infection myocarditis is suspected.  Case was discussed with cardiology by the ER provider and she will be admitted for echocardiogram and cardiology evaluation.  On exam and based on BMP the patient is not in heart failure.   -Follow-up cardiology consult  -Monitor on telemetry  -Follow-up echocardiogram    Hyponatremia  Mild.  Likely secondary to acute illness and poor p.o. intake.  -Monitor    Code Status   Full Code  DVT ppx: Ambulate  Expected length of stay less than 2 days    Clinically Significant Risk Factors Present on Admission         # Hyponatremia: Lowest Na = 131 mmol/L (Ref range: 136-145) in last 2 days, will monitor as appropriate      # Hypoalbuminemia: Lowest albumin = 3.2 g/dL (Ref range: 3.5-5.2) at 11/9/2022  7:44 PM, will monitor as appropriate                 Primary Care Physician   Physician No Ref-Primary    Chief Complaint   Palpitations and Shortness of Breath    History obtained from patient    History of Present Illness   Jerman Toussaint is a 27 year old female with a past medical history of asthma presents to hospital with palpitations.  The patient reports that her  symptoms started on Sunday with nausea vomiting and diarrhea as well as generalized fatigue, body aches, shortness of breath, cough.  Her symptoms started improving but she continued to feel fatigued.  She noticed that she was having palpitations with a sensation of her heart racing with minimal activity.  She has continued to have a cough resulting in soreness of her abdomen ribs and chest.  She has been taking her duo nebs at home with minimal relief of her cough.  During this time she has had poor appetite but has been drinking fluids.  She reports dyspnea on exertion and orthopnea.  She denies any lower extremity swelling.  She went to the urgent care and was positive for influenza A and symptomatic management was recommended.  Due to ongoing symptoms she called nurse line and was recommended to come back to the hospital.  In the emergency room she was found to have T wave inversions diffusely on her EKG and an elevated troponin high.  Case was discussed with cardiology and she is being admitted for evaluation of her likely myocarditis.  Patient was seen resting in bed.  She provides no other complaints.  She denies taking any other medications for symptoms including NSAIDs or Tylenol.  She has never had similar symptoms in the past.    Past Medical History    I have reviewed this patient's medical history and updated it with pertinent information if needed.   Past Medical History:   Diagnosis Date     Asthma        Past Surgical History   I have reviewed this patient's surgical history and updated it with pertinent information if needed.  Past Surgical History:   Procedure Laterality Date     No prior surgeries         Prior to Admission Medications   None     Allergies   No Known Allergies    Social History   I have reviewed this patient's social history and updated it with pertinent information if needed. Jerman Mccrayleslieerica Breana  reports that she does not currently use alcohol.    Family History   I have  reviewed this patient's family history and updated it with pertinent information if needed.   Family History   Problem Relation Age of Onset     Hypertension Mother      Asthma Mother      Hypertension Father      Heart Disease Maternal Grandmother      Coronary Artery Disease Maternal Grandfather        Review of Systems   The 10 point Review of Systems is negative other than noted in the HPI or here.     Physical Exam   Temp: (!) 100.6  F (38.1  C) Temp src: Oral BP: 115/71 Pulse: 87   Resp: 24 SpO2: 98 % O2 Device: None (Room air)    Vital Signs with Ranges  Temp:  [100.6  F (38.1  C)-103.1  F (39.5  C)] 100.6  F (38.1  C)  Pulse:  [] 87  Resp:  [19-24] 24  BP: (115-128)/(71-84) 115/71  SpO2:  [97 %-98 %] 98 %  200 lbs 0 oz  Physical Exam  Vitals reviewed.   Constitutional:       Appearance: Normal appearance.      Comments: Pleasant young lady seen resting in bed comfortably no apparent distress in emergency room.  Patient is well-developed and well-nourished.   HENT:      Head: Normocephalic and atraumatic.      Mouth/Throat:      Mouth: Mucous membranes are moist.      Pharynx: Oropharynx is clear.   Eyes:      Extraocular Movements: Extraocular movements intact.      Conjunctiva/sclera: Conjunctivae normal.      Pupils: Pupils are equal, round, and reactive to light.   Cardiovascular:      Rate and Rhythm: Normal rate and regular rhythm.      Pulses: Normal pulses.      Heart sounds: Normal heart sounds. No murmur heard.  Pulmonary:      Effort: Pulmonary effort is normal.      Breath sounds: Normal breath sounds. No wheezing, rhonchi or rales.   Abdominal:      General: Abdomen is flat. Bowel sounds are normal.      Palpations: Abdomen is soft. There is no mass.      Tenderness: There is no abdominal tenderness. There is no guarding.   Musculoskeletal:         General: No swelling. Normal range of motion.      Cervical back: Normal range of motion and neck supple.   Skin:     General: Skin is warm and  dry.   Neurological:      General: No focal deficit present.      Mental Status: She is alert and oriented to person, place, and time. Mental status is at baseline.      Cranial Nerves: No cranial nerve deficit.

## 2022-11-10 NOTE — ED NOTES
Received pt into care. Cp free on monitor. NSR 95. S1s2 A=r no edema. Denies SOB at rest unless changing position and describes pain to sides of chest. Had NVD none today but feels weak and decreased appetite.

## 2022-11-10 NOTE — PHARMACY-ADMISSION MEDICATION HISTORY
Pharmacy Medication History  Admission medication history interview status for the 11/9/2022  admission is complete. See EPIC admission navigator for prior to admission medications     Location of Interview: Patient room  Medication history sources: Patient and Surescripts    Significant changes made to the medication list:  None    In the past week, patient estimated taking medication this percent of the time: greater than 90%    Additional medication history information:   None. Pt reports that she is not taking any prescription or OTC medications    Medication reconciliation completed by provider prior to medication history? No    Time spent in this activity: 5 minutes    Prior to Admission medications    Not on File       The information provided in this note is only as accurate as the sources available at the time of update(s)

## 2022-11-10 NOTE — PLAN OF CARE
Orientation/Cognitive: A&O  Observation Goals (Met/ Not Met): Not met  Mobility Level/Assist Equipment: SBA  Fall Risk (Y/N): No, although very dyspneic.   Behavior Concerns: calm/cooperative.   Pain Management: 8/10 chest pain, ongoing and unchanged. Prn tylenol given. Prn robitussin  Tele/VS/O2: Tele NSR 1st deg avb. VSS. On RA  ABNL Lab/BG: trop 88, 73, 51. Positive influenza. Na 131  Diet: Reg  Bowel/Bladder: cont  Skin Concerns: n/a  Drains/Devices: n/a  Tests/Procedures for next shift: Cards following.   Anticipated DC date & active delays: 11/11/22; pending improvement in pain, cards work up.  Patient Stated Goal for Today: improved chest pain.

## 2022-11-10 NOTE — ED TRIAGE NOTES
Patient presents with complaints of palpitations, weakness, and shortness of breath for the past three days.      Triage Assessment     Row Name 11/09/22 1930       Triage Assessment (Adult)    Airway WDL WDL       Respiratory WDL    Respiratory WDL X;rhythm/pattern;cough    Rhythm/Pattern, Respiratory shortness of breath       Skin Circulation/Temperature WDL    Skin Circulation/Temperature WDL WDL       Cardiac WDL    Cardiac WDL WDL       Peripheral/Neurovascular WDL    Peripheral Neurovascular WDL WDL       Cognitive/Neuro/Behavioral WDL    Cognitive/Neuro/Behavioral WDL WDL

## 2022-11-11 ENCOUNTER — APPOINTMENT (OUTPATIENT)
Dept: CARDIOLOGY | Facility: CLINIC | Age: 27
End: 2022-11-11
Attending: INTERNAL MEDICINE
Payer: COMMERCIAL

## 2022-11-11 PROCEDURE — 250N000012 HC RX MED GY IP 250 OP 636 PS 637: Performed by: HOSPITALIST

## 2022-11-11 PROCEDURE — 255N000002 HC RX 255 OP 636: Performed by: HOSPITALIST

## 2022-11-11 PROCEDURE — A9585 GADOBUTROL INJECTION: HCPCS | Performed by: HOSPITALIST

## 2022-11-11 PROCEDURE — 99225 PR SUBSEQUENT OBSERVATION CARE,LEVEL II: CPT | Performed by: HOSPITALIST

## 2022-11-11 PROCEDURE — 94640 AIRWAY INHALATION TREATMENT: CPT | Mod: 76

## 2022-11-11 PROCEDURE — G0378 HOSPITAL OBSERVATION PER HR: HCPCS

## 2022-11-11 PROCEDURE — 75561 CARDIAC MRI FOR MORPH W/DYE: CPT

## 2022-11-11 PROCEDURE — 99214 OFFICE O/P EST MOD 30 MIN: CPT | Performed by: INTERNAL MEDICINE

## 2022-11-11 PROCEDURE — 250N000013 HC RX MED GY IP 250 OP 250 PS 637: Performed by: HOSPITALIST

## 2022-11-11 PROCEDURE — 75561 CARDIAC MRI FOR MORPH W/DYE: CPT | Mod: 26 | Performed by: INTERNAL MEDICINE

## 2022-11-11 PROCEDURE — 999N000157 HC STATISTIC RCP TIME EA 10 MIN

## 2022-11-11 PROCEDURE — 250N000009 HC RX 250: Performed by: HOSPITALIST

## 2022-11-11 RX ORDER — PREDNISONE 20 MG/1
40 TABLET ORAL DAILY
Status: DISCONTINUED | OUTPATIENT
Start: 2022-11-11 | End: 2022-11-12 | Stop reason: HOSPADM

## 2022-11-11 RX ORDER — ALBUTEROL SULFATE 0.83 MG/ML
2.5 SOLUTION RESPIRATORY (INHALATION)
Status: DISCONTINUED | OUTPATIENT
Start: 2022-11-11 | End: 2022-11-12 | Stop reason: HOSPADM

## 2022-11-11 RX ORDER — GADOBUTROL 604.72 MG/ML
10 INJECTION INTRAVENOUS ONCE
Status: COMPLETED | OUTPATIENT
Start: 2022-11-11 | End: 2022-11-11

## 2022-11-11 RX ADMIN — OSELTAMIVIR PHOSPHATE 75 MG: 75 CAPSULE ORAL at 20:04

## 2022-11-11 RX ADMIN — OSELTAMIVIR PHOSPHATE 75 MG: 75 CAPSULE ORAL at 08:12

## 2022-11-11 RX ADMIN — IPRATROPIUM BROMIDE AND ALBUTEROL SULFATE 3 ML: .5; 3 SOLUTION RESPIRATORY (INHALATION) at 07:14

## 2022-11-11 RX ADMIN — PREDNISONE 40 MG: 20 TABLET ORAL at 16:42

## 2022-11-11 RX ADMIN — IPRATROPIUM BROMIDE AND ALBUTEROL SULFATE 3 ML: .5; 3 SOLUTION RESPIRATORY (INHALATION) at 10:55

## 2022-11-11 RX ADMIN — IPRATROPIUM BROMIDE AND ALBUTEROL SULFATE 3 ML: .5; 3 SOLUTION RESPIRATORY (INHALATION) at 20:12

## 2022-11-11 RX ADMIN — ALBUTEROL SULFATE 2.5 MG: 2.5 SOLUTION RESPIRATORY (INHALATION) at 23:55

## 2022-11-11 RX ADMIN — GADOBUTROL 10 ML: 604.72 INJECTION INTRAVENOUS at 16:16

## 2022-11-11 RX ADMIN — ALBUTEROL SULFATE 2.5 MG: 2.5 SOLUTION RESPIRATORY (INHALATION) at 16:42

## 2022-11-11 RX ADMIN — IPRATROPIUM BROMIDE AND ALBUTEROL SULFATE 3 ML: .5; 3 SOLUTION RESPIRATORY (INHALATION) at 14:39

## 2022-11-11 ASSESSMENT — ACTIVITIES OF DAILY LIVING (ADL)
ADLS_ACUITY_SCORE: 33

## 2022-11-11 NOTE — PROGRESS NOTES
Observation goals  PRIOR TO DISCHARGE       Comments: -diagnostic tests and consults completed and resulted - not met  -vital signs normal or at patient baseline - met

## 2022-11-11 NOTE — PROGRESS NOTES
"Orientation/Cognitive: A&Ox4  Observation Goals (Met/ Not Met): not met  Mobility Level/Assist Equipment: Independent   Fall Risk (Y/N): no  Behavior Concerns: none  Pain Management: Reports chest tightness all across chest-improved with nebs   Tele/VS/O2: tele-NSR, VSS on RA  ABNL Lab/BG: none  Diet: regular  Bowel/Bladder: continent   Skin Concerns: none  Drains/Devices: IV SL  Tests/Procedures for next sift: none   Anticipated DC date & active delays: Possibly 11/12  Patient Stated Goal for Today: \"work with respiratory therapy\"  "

## 2022-11-11 NOTE — PROGRESS NOTES
Observation goals  PRIOR TO DISCHARGE       Comments: -diagnostic tests and consults completed and resulted - not met  -vital signs normal or at patient baseline - met      DATE&TIME: 11/10 Night   BEHAVIOR & AGGRESSION TOOL: calm, cooperative, green  ORIENTATION/COGNITION: alert and oriented x4  PAIN MANAGMENT: denies pain.  DIET: regular  BOWEL&BLADDER: continent  ABNL LAB/BG: HGB 11.6, hematocrit 34.3  DRAINS/DEVICES: L hand PIV SL  SKIN: WNL  TESTS/PROCDURES:cardiac MRI needed for assessment of myocarditis  DISCHARGE DAY/GOALS/PLACE: 11/11/22,  pending cardiac MRI and improvement.   OTHER IMPORTANT INFO: pt speaks softly

## 2022-11-11 NOTE — PROGRESS NOTES
[facility]    Medicine Progress Note - Hospitalist Service    Date of Admission:  11/9/2022    Assessment & Plan     Jerman Toussaint is a 27 year old female with a past medical history of asthma presents to hospital with palpitations     Influenza A  Patient reports symptoms started on Sunday.  She was febrile in the ER.  Excision saturation stable on room air.  Given her asthma and now myocarditis I will treat her with tamiflu.  - Tylenol as needed.  Avoid NSAIDs due to myocarditis.  - Oseltamivir 75bid for 5 days  - PRN Antitussives.     Probable Myocarditis  Patient reporting palpitations and symptoms of heart racing with minimal exertion and when lying down flat.  Denies any lower extremity swelling.  EKG with diffuse T wave inversions.  Troponin I initially elevated to 88 and repeat came back at 73.  In setting of viral infection myocarditis is suspected.  Case was discussed with cardiology by the ER provider and she will be admitted for echocardiogram and cardiology evaluation.  On exam and based on BMP the patient is not in heart failure.   - cardiology consult; input appreciated.  - contiunue to monitor on telemetry  - echocardiogram - preserved EF 60 - 65%, no WMA, ventricular diastolic function is  Abnormal  -Cardiac MRI pending.    Probable asthma exacerbation  -- will start on a 5 day course of prednisone.      Hyponatremia - resolved  Mild.  Likely secondary to acute illness and poor p.o. intake.  -Monitor     Diet: Regular Diet Adult    DVT Prophylaxis: Pneumatic Compression Devices  Daniel Catheter: Not present  Central Lines: None  Cardiac Monitoring: ACTIVE order. Indication: myocarditis  Code Status: Full Code      Disposition Plan      Expected Discharge Date: 11/12/2022                The patient's care was discussed with the Patient.    Leti Araya MD  Hospitalist Service  [facility]  Securely message with the Vocera Web Console (learn  more here)  Text page via [facility] Paging/Directory         Clinically Significant Risk Factors Present on Admission         # Hyponatremia: Lowest Na = 131 mmol/L (Ref range: 136-145) in last 2 days, will monitor as appropriate  # Hypocalcemia: Lowest Ca = 7.8 mg/dL (Ref range: 8.5 - 10.1 mg/dL) in last 2 days, will monitor and replace as appropriate     # Hypoalbuminemia: Lowest albumin = 3.2 g/dL (Ref range: 3.5-5.2) at 11/9/2022  7:44 PM, will monitor as appropriate                 ______________________________________________________________________    Interval History   Patient notes she is much improved especially after her neb treatment  She complains about not receiving the treatments on  Time. .        Data reviewed today: I reviewed all medications, new labs and imaging results over the last 24 hours. I personally reviewed no images or EKG's today.    Physical Exam   Vital Signs: Temp: 98.5  F (36.9  C) Temp src: Oral BP: (!) 137/96 Pulse: 72   Resp: 22 SpO2: 100 % O2 Device: None (Room air)    Weight: 200 lbs 0 oz  General Appearance: Well appearing for stated age.  Respiratory: CTAB, no rales or ronchi  Cardiovascular: S1, S2 normal, no murmurs  GI: non-tender on palpation, BS present      Data   Recent Labs   Lab 11/10/22  0618 11/09/22  1944   WBC 6.8 7.8   HGB 11.6* 14.1   MCV 81 81    270    131*   POTASSIUM 3.7 3.8   CHLORIDE 110* 100   CO2 23 22   BUN 12 17   CR 0.80 0.96   ANIONGAP 5 9   NINO 7.8* 8.8   GLC 92 113*   ALBUMIN  --  3.2*   PROTTOTAL  --  7.8   BILITOTAL  --  0.9   ALKPHOS  --  57   ALT  --  26   AST  --  37     No results found for this or any previous visit (from the past 24 hour(s)).

## 2022-11-11 NOTE — PROGRESS NOTES
[facility]    Cardiology Progress Note    Date of Service (when I saw the patient): 2022            Assessment and Plan:   ASSESSMENT:  1.  Influenza A  2.  elevated troponin:  Concern for possible viral myocarditis however cardiac MRI is entirely normal.  Suspect modest troponin elevation secondary to hemodynamic stress from fever, tachycardia.    3.  Asthma Exacerbation     RECOMMENDATIONS:  1. Continue supportive cares  2.  No further cardiac work up needed.  Cardiology will sign off.    Pauly Alexis MD [facility]  2022        Interval History:     Receiving NEBS and started on prednisone. Feeling somewhat better.  MRI without evidence for myocarditis performed today.           Medications:       ipratropium - albuterol 0.5 mg/2.5 mg/3 mL  3 mL Nebulization 4x daily     oseltamivir  75 mg Oral BID     predniSONE  40 mg Oral Daily            Physical Exam:   Blood pressure (!) 137/96, pulse 72, temperature 98.5  F (36.9  C), temperature source Oral, resp. rate 22, weight 90.7 kg (200 lb), SpO2 100 %.  Vitals:    22 1931   Weight: 90.7 kg (200 lb)       Intake/Output Summary (Last 24 hours) at 2022 1638  Last data filed at 11/10/2022 2000  Gross per 24 hour   Intake 240 ml   Output --   Net 240 ml           Vital Sign Ranges  Temperature Temp  Av.3  F (36.8  C)  Min: 97.9  F (36.6  C)  Max: 98.5  F (36.9  C)   Blood pressure Systolic (24hrs), Av , Min:113 , Max:137        Diastolic (24hrs), Av, Min:77, Max:96      Pulse Pulse  Av.7  Min: 72  Max: 84   Respirations Resp  Av.8  Min: 18  Max: 22   Pulse oximetry SpO2  Av.5 %  Min: 97 %  Max: 100 %         EXAM:    Constitutional:    in no apparent distress    Skin:    normal    Head:    Normocephalic, atramatic    Eyes:    pupils equal, round and reactive to light, extra ocular muscles intact, sclera clear, conjunctiva normal    Neck:    JVP    Lungs:    CTAB    Cardiovascular:    S1, S2    Abdomen:    normal bowel sounds    Extremities and Back:    no cyanosis or clubbing and No edema bilaterally   Neurological:    No gross or focal neurologic abnormalities               Data:     Recent Labs   Lab Test 11/10/22  0618 11/09/22  1944   WBC 6.8 7.8   HGB 11.6* 14.1   MCV 81 81    270      Recent Labs   Lab Test 11/10/22  0618 11/09/22  1944    131*   POTASSIUM 3.7 3.8   CHLORIDE 110* 100   BUN 12 17   CR 0.80 0.96     Recent Labs   Lab 11/10/22  0618 11/09/22  1944   GLC 92 113*     Recent Labs   Lab Test 11/09/22 1944   ALT 26   AST 37         Pauly Alexis MD, [facility]  Cardiology

## 2022-11-11 NOTE — PROGRESS NOTES
Observation goals  PRIOR TO DISCHARGE        Comments:   -diagnostic tests and consults completed and resulted: met   -vital signs normal or at patient baseline: met   Nurse to notify provider when observation goals have been met and patient is ready for discharge: not met

## 2022-11-11 NOTE — PROGRESS NOTES
Observation goals  PRIOR TO DISCHARGE        Comments:   -diagnostic tests and consults completed and resulted: not met   -vital signs normal or at patient baseline: not met   Nurse to notify provider when observation goals have been met and patient is ready for discharge: not met

## 2022-11-11 NOTE — PROVIDER NOTIFICATION
MD Notification    Notified Person: MD    Notified Person Name: Dr. Perales     Notification Date/Time: 11/11/22 @0941    Notification Interaction: [employer] messaging     Purpose of Notification: Patient requesting a PRN neb. Could we have this ordered?    Orders Received: PRN nebs     Comments:

## 2022-11-11 NOTE — PROGRESS NOTES
Observation goals  PRIOR TO DISCHARGE        Comments: -diagnostic tests and consults completed and resulted - not met  -vital signs normal or at patient baseline - met  Nurse to notify provider when observation goals have been met and patient is ready for discharge.     Orientation/Cognitive: A&O  Observation Goals (Met/ Not Met): Not met  Mobility Level/Assist Equipment: SBA  Fall Risk (Y/N): No, although very dyspneic.   Behavior Concerns: calm/cooperative.   Pain Management: 8/10 ribs pain after cough. Prn tylenol given for pain. Prn robitussin for cough. On scheduled nebs.  Tele/VS/O2: VSS on RA, sats at 97% RA.Tele NSR.  ABNL Lab/BG: trop 88, 73, 51. Positive influenza A.   Diet: Reg  Bowel/Bladder: continent. Diarrhea X2 per pt.   Skin Concerns: n/a  Drains/Devices: PIV SL  Tests/Procedures for next shift: Cardiology following.   Anticipated DC date & active delays: 11/11/22; pending improvement in pain, cards work up/Cardiac MRI  Patient Stated Goal for Today: to control cough

## 2022-11-12 VITALS
HEART RATE: 71 BPM | WEIGHT: 200 LBS | RESPIRATION RATE: 20 BRPM | OXYGEN SATURATION: 98 % | SYSTOLIC BLOOD PRESSURE: 136 MMHG | DIASTOLIC BLOOD PRESSURE: 99 MMHG | TEMPERATURE: 97.6 F

## 2022-11-12 PROCEDURE — 999N000157 HC STATISTIC RCP TIME EA 10 MIN: Mod: 76

## 2022-11-12 PROCEDURE — 250N000012 HC RX MED GY IP 250 OP 636 PS 637: Performed by: HOSPITALIST

## 2022-11-12 PROCEDURE — 250N000009 HC RX 250: Performed by: HOSPITALIST

## 2022-11-12 PROCEDURE — 250N000011 HC RX IP 250 OP 636: Performed by: HOSPITALIST

## 2022-11-12 PROCEDURE — 94640 AIRWAY INHALATION TREATMENT: CPT | Mod: 76

## 2022-11-12 PROCEDURE — G0378 HOSPITAL OBSERVATION PER HR: HCPCS

## 2022-11-12 PROCEDURE — 250N000013 HC RX MED GY IP 250 OP 250 PS 637: Performed by: HOSPITALIST

## 2022-11-12 PROCEDURE — 96376 TX/PRO/DX INJ SAME DRUG ADON: CPT

## 2022-11-12 PROCEDURE — 99217 PR OBSERVATION CARE DISCHARGE: CPT | Performed by: HOSPITALIST

## 2022-11-12 RX ORDER — ONDANSETRON 4 MG/1
4 TABLET, ORALLY DISINTEGRATING ORAL EVERY 8 HOURS PRN
Qty: 10 TABLET | Refills: 0 | Status: SHIPPED | OUTPATIENT
Start: 2022-11-12

## 2022-11-12 RX ORDER — PREDNISONE 20 MG/1
40 TABLET ORAL DAILY
Qty: 6 TABLET | Refills: 0 | Status: SHIPPED | OUTPATIENT
Start: 2022-11-13 | End: 2022-11-16

## 2022-11-12 RX ORDER — PREDNISONE 20 MG/1
40 TABLET ORAL DAILY
Qty: 6 TABLET | Refills: 0 | Status: SHIPPED | OUTPATIENT
Start: 2022-11-13 | End: 2022-11-12

## 2022-11-12 RX ORDER — OSELTAMIVIR PHOSPHATE 75 MG/1
75 CAPSULE ORAL 2 TIMES DAILY
Qty: 4 CAPSULE | Refills: 0 | Status: SHIPPED | OUTPATIENT
Start: 2022-11-12 | End: 2022-11-12

## 2022-11-12 RX ORDER — OSELTAMIVIR PHOSPHATE 75 MG/1
75 CAPSULE ORAL 2 TIMES DAILY
Qty: 4 CAPSULE | Refills: 0 | Status: SHIPPED | OUTPATIENT
Start: 2022-11-12

## 2022-11-12 RX ADMIN — ALBUTEROL SULFATE 2.5 MG: 2.5 SOLUTION RESPIRATORY (INHALATION) at 02:45

## 2022-11-12 RX ADMIN — PREDNISONE 40 MG: 20 TABLET ORAL at 07:29

## 2022-11-12 RX ADMIN — ACETAMINOPHEN 650 MG: 325 TABLET ORAL at 02:45

## 2022-11-12 RX ADMIN — ONDANSETRON 4 MG: 2 INJECTION INTRAMUSCULAR; INTRAVENOUS at 10:54

## 2022-11-12 RX ADMIN — IPRATROPIUM BROMIDE AND ALBUTEROL SULFATE 3 ML: .5; 3 SOLUTION RESPIRATORY (INHALATION) at 07:11

## 2022-11-12 RX ADMIN — IPRATROPIUM BROMIDE AND ALBUTEROL SULFATE 3 ML: .5; 3 SOLUTION RESPIRATORY (INHALATION) at 10:53

## 2022-11-12 RX ADMIN — GUAIFENESIN AND CODEINE PHOSPHATE 5 ML: 100; 10 SOLUTION ORAL at 03:09

## 2022-11-12 RX ADMIN — OSELTAMIVIR PHOSPHATE 75 MG: 75 CAPSULE ORAL at 07:29

## 2022-11-12 ASSESSMENT — ACTIVITIES OF DAILY LIVING (ADL)
ADLS_ACUITY_SCORE: 33

## 2022-11-12 NOTE — PROVIDER NOTIFICATION
"MD Notification    Notified Person: MD    Notified Person Name: Dr. Stanley Hernandez     Notification Date/Time: 11/11/22 @6904    Notification Interaction: [employer] messaging     Purpose of Notification: FYKEVIN Arias has been saline locked since yesterday per patient. She has not had the normal saline infusion. Would you like this given?     Orders Received: \"Does not need it any longer if eating and drinking\"    Comments: patient has good appetite and drinking fluids     "

## 2022-11-12 NOTE — PROGRESS NOTES
Observation goals  PRIOR TO DISCHARGE        Comments:   -diagnostic tests and consults completed and resulted: met   -vital signs normal or at patient baseline: met   Nurse to notify provider when observation goals have been met and patient is ready for discharge: met

## 2022-11-12 NOTE — PLAN OF CARE
Goal Outcome Evaluation:  Orientation/Cognitive: A&O X4  Observation Goals (Met/ Not Met): Partially met   Mobility Level/Assist Equipment: IND  Fall Risk (Y/N): No  Behavior Concerns: None  Pain Management: PRN tylenol   Tele/VS/O2: VSS on RA , Tele- SR  ABNL Lab/BG: None  Diet: Regular  Bowel/Bladder: Continent   Skin Concerns: None  Drains/Devices: PIV SL   Tests/Procedures for next shift: None  Anticipated DC date & active delays: 11/12  Patient Stated Goal for Today: Rest     Observation goals  PRIOR TO DISCHARGE       Comments:   -diagnostic tests and consults completed and resulted- MET   -vital signs normal or at patient baseline- MET

## 2022-11-12 NOTE — DISCHARGE SUMMARY
[facility]  Hospitalist Discharge Summary      Date of Admission:  11/9/2022  Date of Discharge:  11/12/2022 12:58 PM  Discharging Provider: Leti Araya MD  Discharge Service: Hospitalist Service    Discharge Diagnoses   Asthma exacerbation  Influenza A infection    Follow-ups Needed After Discharge   Follow-up Appointments     Follow-up and recommended labs and tests       Follow up with primary care provider, Physician No Ref-Primary, within 7   days for hospital follow- up.  No follow up labs or test are needed.           Unresulted Labs Ordered in the Past 30 Days of this Admission     No orders found from 10/10/2022 to 11/10/2022.      These results will be followed up by     Discharge Disposition   Discharged to home  Condition at discharge: Stable      Hospital Course   Jerman Toussaint is a 27 year old female with a past medical history of asthma presents to hospital with palpitations. Found to have Influenza A and asthma exacerbation due to that, started on Tamiflu and prednisone and discharged to complete a 5 day course.     Concern for Myocarditis due to abnormal EKG and troponin elevation, this was ruled out with a negative cardiac MRI.   Of note, patient was nauseous with vomiting x1 right before discharge. This was right after breakfast, likely food poisoning. Patient stable before discharge.     Please see detailed progress note below.      Influenza A  Patient reports symptoms started on Sunday.  She was febrile in the ER.  Excision saturation stable on room air.  Given her asthma and now myocarditis I will treat her with tamiflu.  - Tylenol as needed.  Avoid NSAIDs due to myocarditis.  - Oseltamivir 75bid for 5 days  - PRN Antitussives.     Probable Myocarditis  Patient reporting palpitations and symptoms of heart racing with minimal exertion and when lying down flat.  Denies any lower extremity swelling.  EKG with diffuse T wave inversions.  Troponin I initially  elevated to 88 and repeat came back at 73.  In setting of viral infection myocarditis is suspected.  Case was discussed with cardiology by the ER provider and she will be admitted for echocardiogram and cardiology evaluation.  On exam and based on BMP the patient is not in heart failure.   - cardiology consult; input appreciated.  - contiunue to monitor on telemetry  - echocardiogram - preserved EF 60 - 65%, no WMA, ventricular diastolic function is  Abnormal  -Cardiac MRI negative    Probable asthma exacerbation  -- will start on a 5 day course of prednisone.      Hyponatremia - resolved  Mild.  Likely secondary to acute illness and poor p.o. intake.  -Monitor    Consultations This Hospital Stay   CARDIOLOGY IP CONSULT    Code Status   Full Code    Time Spent on this Encounter   I, Leti Araya MD, personally saw the patient today and spent greater than 30 minutes discharging this patient.       Leti Araya MD  [facility]TENDED RECOV[facility] SHORT STAY  [address] [place] [id number]  Phone: [phone]  ______________________________________________________________________    Physical Exam   Vital Signs: Temp: 97.6  F (36.4  C) Temp src: Oral BP: (!) 136/99 Pulse: 71   Resp: 20 SpO2: 98 % O2 Device: None (Room air)    Weight: 200 lbs 0 oz  General Appearance: Well appearing for stated age.  Respiratory: CTAB, no rales or ronchi  Cardiovascular: S1, S2 normal, no murmurs  GI: non-tender on palpation, BS present         Primary Care Physician   Physician No Ref-Primary    Discharge Orders      Reason for your hospital stay    You were treated for influenza A infection and asthma exacerbation     Follow-up and recommended labs and tests     Follow up with primary care provider, Physician No Ref-Primary, within 7 days for hospital follow- up.  No follow up labs or test are needed.     Activity    Your activity upon discharge: activity as tolerated     Diet    Follow this diet upon  discharge: Orders Placed This Encounter      Regular Diet Adult       Significant Results and Procedures   Most Recent 3 CBC's:Recent Labs   Lab Test 11/10/22  0618 22  1944   WBC 6.8 7.8   HGB 11.6* 14.1   MCV 81 81    270   ,   Results for orders placed or performed during the hospital encounter of 22   XR Chest 2 Views    Narrative    EXAM: XR CHEST 2 VIEWS  LOCATION: [facility]  DATE/TIME: 2022 8:48 PM    INDICATION: Shortness of breath.  COMPARISON: None.      Impression    IMPRESSION: Negative chest.   Echocardiogram Complete     Value    LVEF  60-65%    Narrative    [id number]  [id number]  [id number]  [id number]^EDVIN^MARI^IRMA     [facility]  Echocardiography Laboratory  [address] [place] [address]     Name: PJ LAO  MRN: [id number]  : 1995  Study Date: 11/10/2022 10:48 AM  Age: 27 yrs  Gender: Female  Patient [facility]  Reason For Study: Myocarditis  Ordering Physician: MARI PARDO  Referring Physician: MARI PARDO  Performed By: JAVIER Guevara     BSA: 2.1 m2  Height: 71 in  Weight: 200 lb  HR: 75  BP: 106/75 mmHg  ______________________________________________________________________________  Procedure  Complete Portable Echo Adult. Optison (NDC #[id number]) given intravenously.  ______________________________________________________________________________  Interpretation Summary     Left ventricular systolic function is normal.  The visual ejection fraction is 60-65%.  Normal left ventricular wall motion  The right ventricle is normal in structure, function and size.  No significant valve dysfunction.  The inferior vena cava was normal in size with preserved respiratory  variability.  There is no pericardial effusion.     No prior study for comparison.  ______________________________________________________________________________  Left Ventricle  The left ventricle is  normal in structure, function and size. There is normal  left ventricular wall thickness. Left ventricular systolic function is normal.  The visual ejection fraction is 60-65%. Left ventricular diastolic function is  abnormal. Normal left ventricular wall motion.     Right Ventricle  The right ventricle is normal in structure, function and size.     Atria  Normal left atrial size. Right atrial size is normal.     Mitral Valve  The mitral valve is normal in structure and function.     Tricuspid Valve  The tricuspid valve is normal in structure and function. Right ventricular  systolic pressure could not be approximated due to inadequate tricuspid  regurgitation.     Aortic Valve  The aortic valve is normal in structure and function.     Pulmonic Valve  The pulmonic valve is normal in structure and function.     Vessels  The aortic root is normal size. Normal size ascending aorta. The inferior vena  cava was normal in size with preserved respiratory variability.     Pericardium  There is no pericardial effusion.     ______________________________________________________________________________  MMode/2D Measurements & Calculations  IVSd: 0.97 cm  LVIDd: 4.7 cm  LVIDs: 3.0 cm  LVPWd: 0.74 cm  FS: 35.7 %  LV mass(C)d: 133.8 grams  LV mass(C)dI: 63.4 grams/m2     Ao root diam: 3.2 cm  asc Aorta Diam: 3.5 cm  LVOT diam: 1.9 cm  LVOT area: 2.9 cm2  LA Volume (BP): 31.2 ml  LA Volume Index (BP): 14.8 ml/m2     LA Volume Indexed (AL/bp): 15.1 ml/m2  RWT: 0.32     Doppler Measurements & Calculations  MV E max bogdan: 88.6 cm/sec  MV A max bogdan: 87.5 cm/sec  MV E/A: 1.0  MV dec time: 0.21 sec  PA acc time: 0.14 sec  E/E' av.1  Lateral E/e': 10.1  Medial E/e': 8.1     ______________________________________________________________________________  Report approved by: Aguilar Brown 11/10/2022 11:53 AM         MR Cardiac w contrast    Narrative                                                     [facility]                                                      CMR Report      MRN:                     [id number]                                  Name:        PJ LAO                                  :                     1995                                  Scan Date:      2022 15:08:07                                  Electronically signed by Jaime Carlos  16:24:06    VITALS   ==========================================================================================================    HEIGHT: 71.00 in    (180.34 cm)  WEIGHT: 200.00 lbs    (90.72 kgs)  BSA: 2.11 m^2    FINAL IMPRESSION   ==========================================================================================================    Normal biventricular size with hyperdynamic LV systolic function. Quantitative LVEF 79 %. Quantitative RVEF  66 %.  Delayed hyperenhancement reveals no scar, fibrosis or evidence of infiltrative disease.   T2 mapping reveals no evidence of increased myocardial fluid overload.   No evidence of myocarditis.     SUMMARY   ==========================================================================================================    LEFT VENTRICLE: LV wall thickness is normal. LV cavity size is normal. LV systolic function is hyperdynamic. Quantitative  LVEF 79 %.    VIABILITY: Delayed hyperenhancement reveals no scar, fibrosis or evidence of infiltrative disease.     RIGHT VENTRICLE: RV cavity size is normal. RV systolic function is normal. Quantitative RVEF 66 %.    LEFT ATRIUM: LA cavity size is normal.    RIGHT ATRIUM: RA cavity size is normal.    PERICARDIUM: There is no pericardial effusion.    AORTIC VALVE: Aortic valve is trileaflet.    AORTIC ROOT: The aortic root is normal in size.    OTHER FINDINGS: T2 mapping reveals no evidence of increased myocardial fluid overload. T2 sequences does not reveal  evidence of myocardial iron overload. T1 mapping reveals left ventricular extracellular volume at  upper  limits of normal at 31%.       CORE EXAM   ==========================================================================================================    MEASUREMENTS   ----------------------------------------------------------------------------------------      VOLUMETRIC ANALYSIS       ----------------------------------------------  .--------------------------------------------------------.                   LV    Reference  RV    Reference    +-----+-----------+------+-----------+------+------------+   EDV  ml          138   ()   154   (100-184)         ml/m^2       66   (65-99)     73   ()     ESV  ml           30   (29-66)     53   (29-82)           ml/m^2       14   (19-37)     25   (20-45)      CO   L/min      7.70             7.19                     L/min/m^2  3.65             3.41                SV   ml          108   ()   101   ()          ml/m^2       51   (42-66)     48   (39-63)      EF   %            79   (56-75)     66   (49-73)     '-----+-----------+------+-----------+------+------------'            CARDIAC OUTPUT HR:  71 BPM      LV DIMENSIONS       ----------------------------------------------          WALL THICKNESS - ANTEROSEPTAL:  1.1 cm          WALL THICKNESS - INFEROLATERAL:  1.1 cm          LV GAYLE:  4.4 cm        LA DIMENSIONS (LV SYSTOLE)       ----------------------------------------------          DIAMETER:  3.6 cm        AORTIC ROOT DIMENSIONS       ----------------------------------------------          ANNULUS:  3 cm        IRON QUANTIFICATION       ----------------------------------------------          MYOCARDIAL T2*:  39 msec      17 SEGMENT   ----------------------------------------------------------------------------------------  .-----------------------------------------------------------------------------------------.   Segments            Wall Motion  Hyperenhancement   Stress Perfusion  Interpretation   +--------------------+-------------+------------------+------------------+----------------+   Base Anterior                    None                                                  Base Anteroseptal                None                                                  Base Inferoseptal                None                                                  Base Inferior                    None                                                  Base Inferolateral               None                                                  Base Anterolateral               None                                                  Mid Anterior                     None                                                  Mid Anteroseptal                 None                                                  Mid Inferoseptal                 None                                                  Mid Inferior                     None                                                  Mid Inferolateral                None                                                  Mid Anterolateral                None                                                  Apical Anterior                  None                                                  Apical Septal                    None                                                  Apical Inferior                  None                                                  Apical Lateral                   None                                                  [place]                             None                                                 +--------------------+-------------+------------------+------------------+----------------+   RV Segments         Wall Motion  Hyperenhancement                    Interpretation    +--------------------+-------------+------------------+------------------+----------------+   RV Basal Anterior                None                                                  RV Basal Inferior                None                                                  RV Mid                           None                                                  RV Apical                        None                                                 '--------------------+-------------+------------------+------------------+----------------'        FINDINGS       ----------------------------------------------          LV SCAR SIZE (17 SEGMENT):  0 %      SCAN INFO   ==========================================================================================================    GENERAL   ----------------------------------------------------------------------------------------      SCANNER       ----------------------------------------------          :  SIEMENS          MODEL:  Aera          PULSE SEQUENCES:  SSFP cine, 2D LGE segmented, 2D LGE single-shot, Pre-contrast T1 mapping,          Post-contrast T1 mapping, T2 mapping         CONTRAST AGENT       ----------------------------------------------          TYPE:  Gadavist          GD CONCENTRATION:  0.5 M          VOLUME ADMINISTERED:  10 ml          DOSAGE:  0.06 mmol/kg        SETUP       ----------------------------------------------          REASON(S) FOR SCAN:  Myocarditis (known/suspect)           ATTENDING PHYSICIAN:  DELIA ZAVALA   ==========================================================================================================    CPT Codes  ICD10 Codes      Report generated by [employer], a product of [employer]       Discharge Medications   Current Discharge Medication List      START taking these medications    Details   oseltamivir (TAMIFLU) 75 MG capsule Take 1 capsule (75 mg) by mouth  2 times daily for 2 days  Qty: 4 capsule, Refills: 0    Associated Diagnoses: Influenza A      predniSONE (DELTASONE) 20 MG tablet Take 2 tablets (40 mg) by mouth daily for 3 days  Qty: 6 tablet, Refills: 0    Associated Diagnoses: Influenza A           Allergies   Allergies   Allergen Reactions     Percocet [Oxycodone-Acetaminophen] GI Disturbance

## 2022-11-12 NOTE — PROGRESS NOTES
Observation goals  PRIOR TO DISCHARGE       Comments:   -diagnostic tests and consults completed and resulted- MET   -vital signs normal or at patient baseline- MET

## 2022-11-12 NOTE — PROGRESS NOTES
"Orientation/Cognitive: A&Ox4  Observation Goals (Met/ Not Met): met  Mobility Level/Assist Equipment: Independent  Fall Risk (Y/N): No  Behavior Concerns: none  Pain Management: denies  Tele/VS/O2: Tele NSR, VSS on RA  ABNL Lab/BG: none  Diet: regular-PRN Zofran given for nausea and vomiting x1 with relief   Bowel/Bladder: continent   Skin Concerns: none  Drains/Devices: IV SL-removed at discharge   Tests/Procedures for next shift: none  Anticipated DC date & active delays: Today  Patient Stated Goal for Today: \"rest and recover\"    AVS reviewed with patient. Verbalized understanding, all questions answered. Sent home with prescribed medications. All belongings returned to patient. Discharging to home.     "

## 2022-11-13 ENCOUNTER — NURSE TRIAGE (OUTPATIENT)
Dept: NURSING | Facility: CLINIC | Age: 27
End: 2022-11-13

## 2022-11-13 NOTE — TELEPHONE ENCOUNTER
"Patient just got out of the hospital yesterday.  Patient is having shortness of breath.  Patient has a history of asthma.  Denies having a pcp/clinic.  Patient feels that she needs an inhaler and nebulizer machine.  FNA asked parent if she uses inhaler \"yes I use my child's\".  Patient is speaking in complete sentences and denies bluish lips and denies confusion and denies fainting.  Patient feels she is having shortness of breath at rest.      Reason for Disposition    [1] MODERATE asthma attack (e.g., SOB at rest, speaks in phrases, audible wheezes) AND [2] doesn't have neb or inhaler available    Additional Information    Negative: SEVERE asthma attack (e.g., struggling for each breath, speaks in single words, loud wheezes, pulse >120)  (RED  Zone)    Negative: Bluish (or gray) lips or face now    Negative: Difficult to awaken or acting confused (e.g., disoriented, slurred speech)    Negative: Passed out (i.e., lost consciousness, collapsed and was not responding)    Negative: Wheezing started suddenly after medicine, an allergic food, or bee sting    Negative: Sounds like a life-threatening emergency to the triager    Protocols used: ASTHMA ATTACK-A-AH      "

## 2022-11-14 ENCOUNTER — PATIENT OUTREACH (OUTPATIENT)
Dept: CARE COORDINATION | Facility: CLINIC | Age: 27
End: 2022-11-14

## 2022-11-14 NOTE — PROGRESS NOTES
"Clinic Care Coordination Contact  [facility]: Post-Discharge Note  SITUATION                                                      Admission:    Admission Date: 11/09/22   Reason for Admission: Palpitations and Shortness of Breath  Discharge:   Discharge Date: 11/12/22  Discharge Diagnosis: Asthma exacerbation    BACKGROUND                                                      Per hospital discharge summary and inpatient provider notes:Jerman Toussaint is a 27 year old female with a past medical history of asthma presents to hospital with palpitations. Found to have Influenza A and asthma exacerbation due to that, started on Tamiflu and prednisone and discharged to complete a 5 day course.      Concern for Myocarditis due to abnormal EKG and troponin elevation, this was ruled out with a negative cardiac MRI.   Of note, patient was nauseous with vomiting x1 right before discharge. This was right after breakfast, likely food poisoning. Patient stable before discharge.       ASSESSMENT           Discharge Assessment  How are you doing now that you are home?: \" I am doing ok today \"  How are your symptoms? (Red Flag symptoms escalate to triage hotline per guidelines): Improved  Do you feel your condition is stable enough to be safe at home until your provider visit?: Yes  Does the patient have their discharge instructions? : Yes  Does the patient have questions regarding their discharge instructions? : No  Were you started on any new medications or were there changes to any of your previous medications? : Yes  Does the patient have all of their medications?: Yes  Do you have questions regarding any of your medications? : No  Do you have all of your needed medical supplies or equipment (DME)?  (i.e. oxygen tank, CPAP, cane, etc.): Yes  Discharge follow-up appointment scheduled within 14 calendar days? : No    Post-op ([facility] CTA Only)  If the patient had a surgery or procedure, do they have any questions for a " nurse?: No             PLAN                                                      Outpatient Plan: Follow-up Appointments     Follow-up and recommended labs and tests       Follow up with primary care provider, Physician No Ref-Primary, within 7   days for hospital follow- up.  No follow up labs or test are needed.       No future appointments.      For any urgent concerns, please contact our 24 hour nurse triage line: [phone] ([phone])         Maria Antonia Grullon

## 2022-11-15 ENCOUNTER — TRANSFERRED RECORDS (OUTPATIENT)
Dept: HEALTH INFORMATION MANAGEMENT | Facility: CLINIC | Age: 27
End: 2022-11-15

## 2022-11-15 LAB
ANION GAP SERPL CALCULATED.3IONS-SCNC: 6 MMOL/L (ref 3–14)
BASOPHILS # BLD AUTO: 0 10E3/UL (ref 0–0.2)
BASOPHILS NFR BLD AUTO: 0 %
BUN SERPL-MCNC: 13 MG/DL (ref 7–30)
CALCIUM SERPL-MCNC: 8.7 MG/DL (ref 8.5–10.1)
CHLORIDE BLD-SCNC: 106 MMOL/L (ref 94–109)
CO2 SERPL-SCNC: 24 MMOL/L (ref 20–32)
CREAT SERPL-MCNC: 0.85 MG/DL (ref 0.52–1.04)
EOSINOPHIL # BLD AUTO: 0.1 10E3/UL (ref 0–0.7)
EOSINOPHIL NFR BLD AUTO: 1 %
ERYTHROCYTE [DISTWIDTH] IN BLOOD BY AUTOMATED COUNT: 14.8 % (ref 10–15)
FLUAV RNA SPEC QL NAA+PROBE: NEGATIVE
FLUBV RNA RESP QL NAA+PROBE: NEGATIVE
GFR SERPL CREATININE-BSD FRML MDRD: >90 ML/MIN/1.73M2
GLUCOSE BLD-MCNC: 112 MG/DL (ref 70–99)
HCT VFR BLD AUTO: 35.3 % (ref 35–47)
HGB BLD-MCNC: 11.7 G/DL (ref 11.7–15.7)
HOLD SPECIMEN: NORMAL
IMM GRANULOCYTES # BLD: 0.1 10E3/UL
IMM GRANULOCYTES NFR BLD: 1 %
LYMPHOCYTES # BLD AUTO: 1.5 10E3/UL (ref 0.8–5.3)
LYMPHOCYTES NFR BLD AUTO: 12 %
MCH RBC QN AUTO: 27 PG (ref 26.5–33)
MCHC RBC AUTO-ENTMCNC: 33.1 G/DL (ref 31.5–36.5)
MCV RBC AUTO: 82 FL (ref 78–100)
MONOCYTES # BLD AUTO: 1 10E3/UL (ref 0–1.3)
MONOCYTES NFR BLD AUTO: 8 %
NEUTROPHILS # BLD AUTO: 9.7 10E3/UL (ref 1.6–8.3)
NEUTROPHILS NFR BLD AUTO: 78 %
NRBC # BLD AUTO: 0 10E3/UL
NRBC BLD AUTO-RTO: 0 /100
PLATELET # BLD AUTO: 380 10E3/UL (ref 150–450)
POTASSIUM BLD-SCNC: 3.2 MMOL/L (ref 3.4–5.3)
RBC # BLD AUTO: 4.33 10E6/UL (ref 3.8–5.2)
RSV RNA SPEC NAA+PROBE: NEGATIVE
SARS-COV-2 RNA RESP QL NAA+PROBE: NEGATIVE
SODIUM SERPL-SCNC: 136 MMOL/L (ref 133–144)
TROPONIN I SERPL HS-MCNC: 7 NG/L
WBC # BLD AUTO: 12.4 10E3/UL (ref 4–11)

## 2022-11-15 PROCEDURE — 80048 BASIC METABOLIC PNL TOTAL CA: CPT | Performed by: EMERGENCY MEDICINE

## 2022-11-15 PROCEDURE — 85025 COMPLETE CBC W/AUTO DIFF WBC: CPT | Performed by: EMERGENCY MEDICINE

## 2022-11-15 PROCEDURE — 87637 SARSCOV2&INF A&B&RSV AMP PRB: CPT | Performed by: EMERGENCY MEDICINE

## 2022-11-15 PROCEDURE — 84484 ASSAY OF TROPONIN QUANT: CPT | Performed by: EMERGENCY MEDICINE

## 2022-11-15 PROCEDURE — 36415 COLL VENOUS BLD VENIPUNCTURE: CPT | Performed by: EMERGENCY MEDICINE

## 2022-11-15 PROCEDURE — 94640 AIRWAY INHALATION TREATMENT: CPT

## 2022-11-15 PROCEDURE — 99285 EMERGENCY DEPT VISIT HI MDM: CPT | Mod: 25

## 2022-11-15 PROCEDURE — 96374 THER/PROPH/DIAG INJ IV PUSH: CPT | Mod: 59

## 2022-11-15 PROCEDURE — C9803 HOPD COVID-19 SPEC COLLECT: HCPCS

## 2022-11-16 ENCOUNTER — HOSPITAL ENCOUNTER (EMERGENCY)
Facility: CLINIC | Age: 27
Discharge: HOME | End: 2022-11-16
Attending: EMERGENCY MEDICINE | Admitting: EMERGENCY MEDICINE
Payer: COMMERCIAL

## 2022-11-16 ENCOUNTER — APPOINTMENT (OUTPATIENT)
Dept: CT IMAGING | Facility: CLINIC | Age: 27
End: 2022-11-16
Attending: EMERGENCY MEDICINE
Payer: COMMERCIAL

## 2022-11-16 VITALS
HEIGHT: 71 IN | BODY MASS INDEX: 27.89 KG/M2 | HEART RATE: 86 BPM | SYSTOLIC BLOOD PRESSURE: 131 MMHG | TEMPERATURE: 98.4 F | DIASTOLIC BLOOD PRESSURE: 102 MMHG | OXYGEN SATURATION: 99 % | RESPIRATION RATE: 18 BRPM

## 2022-11-16 DIAGNOSIS — J18.9 COMMUNITY ACQUIRED PNEUMONIA, UNSPECIFIED LATERALITY: Primary | ICD-10-CM

## 2022-11-16 DIAGNOSIS — J45.901 EXACERBATION OF PERSISTENT ASTHMA, UNSPECIFIED ASTHMA SEVERITY: ICD-10-CM

## 2022-11-16 LAB
ATRIAL RATE - MUSE: 105 BPM
DIASTOLIC BLOOD PRESSURE - MUSE: NORMAL MMHG
INTERPRETATION ECG - MUSE: NORMAL
P AXIS - MUSE: 74 DEGREES
PR INTERVAL - MUSE: 174 MS
QRS DURATION - MUSE: 94 MS
QT - MUSE: 346 MS
QTC - MUSE: 457 MS
R AXIS - MUSE: 69 DEGREES
SYSTOLIC BLOOD PRESSURE - MUSE: NORMAL MMHG
T AXIS - MUSE: 36 DEGREES
VENTRICULAR RATE- MUSE: 105 BPM

## 2022-11-16 PROCEDURE — 250N000011 HC RX IP 250 OP 636: Performed by: EMERGENCY MEDICINE

## 2022-11-16 PROCEDURE — 250N000009 HC RX 250: Performed by: EMERGENCY MEDICINE

## 2022-11-16 PROCEDURE — 250N000013 HC RX MED GY IP 250 OP 250 PS 637: Performed by: EMERGENCY MEDICINE

## 2022-11-16 PROCEDURE — 71275 CT ANGIOGRAPHY CHEST: CPT

## 2022-11-16 RX ORDER — POTASSIUM CHLORIDE 1.5 G/1.58G
20 POWDER, FOR SOLUTION ORAL ONCE
Status: COMPLETED | OUTPATIENT
Start: 2022-11-16 | End: 2022-11-16

## 2022-11-16 RX ORDER — ALBUTEROL SULFATE 0.83 MG/ML
2.5 SOLUTION RESPIRATORY (INHALATION) EVERY 6 HOURS PRN
Qty: 75 ML | Refills: 0 | Status: SHIPPED | OUTPATIENT
Start: 2022-11-16

## 2022-11-16 RX ORDER — METHYLPREDNISOLONE SODIUM SUCCINATE 125 MG/2ML
125 INJECTION, POWDER, LYOPHILIZED, FOR SOLUTION INTRAMUSCULAR; INTRAVENOUS ONCE
Status: COMPLETED | OUTPATIENT
Start: 2022-11-16 | End: 2022-11-16

## 2022-11-16 RX ORDER — PREDNISONE 10 MG/1
TABLET ORAL
Qty: 32 TABLET | Refills: 0 | Status: SHIPPED | OUTPATIENT
Start: 2022-11-16 | End: 2022-11-16

## 2022-11-16 RX ORDER — PREDNISONE 10 MG/1
TABLET ORAL
Qty: 32 TABLET | Refills: 0 | Status: SHIPPED | OUTPATIENT
Start: 2022-11-16

## 2022-11-16 RX ORDER — IPRATROPIUM BROMIDE AND ALBUTEROL SULFATE 2.5; .5 MG/3ML; MG/3ML
3 SOLUTION RESPIRATORY (INHALATION) ONCE
Status: COMPLETED | OUTPATIENT
Start: 2022-11-16 | End: 2022-11-16

## 2022-11-16 RX ORDER — DOXYCYCLINE 100 MG/1
100 CAPSULE ORAL 2 TIMES DAILY
Qty: 14 CAPSULE | Refills: 0 | Status: SHIPPED | OUTPATIENT
Start: 2022-11-16 | End: 2022-11-23

## 2022-11-16 RX ORDER — ALBUTEROL SULFATE 0.83 MG/ML
2.5 SOLUTION RESPIRATORY (INHALATION) EVERY 6 HOURS PRN
Qty: 75 ML | Refills: 0 | Status: SHIPPED | OUTPATIENT
Start: 2022-11-16 | End: 2022-11-16

## 2022-11-16 RX ORDER — IOPAMIDOL 755 MG/ML
73 INJECTION, SOLUTION INTRAVASCULAR ONCE
Status: COMPLETED | OUTPATIENT
Start: 2022-11-16 | End: 2022-11-16

## 2022-11-16 RX ADMIN — IOPAMIDOL 73 ML: 755 INJECTION, SOLUTION INTRAVENOUS at 02:41

## 2022-11-16 RX ADMIN — POTASSIUM CHLORIDE 20 MEQ: 1.5 POWDER, FOR SOLUTION ORAL at 01:27

## 2022-11-16 RX ADMIN — SODIUM CHLORIDE 100 ML: 9 INJECTION, SOLUTION INTRAVENOUS at 02:41

## 2022-11-16 RX ADMIN — IPRATROPIUM BROMIDE AND ALBUTEROL SULFATE 3 ML: .5; 3 SOLUTION RESPIRATORY (INHALATION) at 01:28

## 2022-11-16 RX ADMIN — METHYLPREDNISOLONE SODIUM SUCCINATE 125 MG: 125 INJECTION, POWDER, FOR SOLUTION INTRAMUSCULAR; INTRAVENOUS at 01:27

## 2022-11-16 ASSESSMENT — ENCOUNTER SYMPTOMS
CHEST TIGHTNESS: 1
VOMITING: 0
SHORTNESS OF BREATH: 1
ABDOMINAL PAIN: 0

## 2022-11-16 ASSESSMENT — ACTIVITIES OF DAILY LIVING (ADL)
ADLS_ACUITY_SCORE: 35
ADLS_ACUITY_SCORE: 33

## 2022-11-16 NOTE — ED PROVIDER NOTES
"  History   Chief Complaint:  Shortness of Breath     The history is provided by the patient.      Jerman Toussaint is a 27 year old female with history of asthma who presents with shortness of breath. She was recently admitted to the hospital for influenza and has been very short of breath since being discharged on the 12th, with associated chest tightness. She states that she had used 3 nebulizer before coming. She denies any abdominal pain, vomiting, and swelling of the legs. She states that she took the last of her prednisone doses today and is now out.  While admitted in the hospital she was treated for influenza and completed a course of Tamiflu.  While in the hospital her troponin was slightly elevated but myocarditis was ruled out with cardiac MRI.  She also had an echocardiogram that did not demonstrate effusion.    Review of Systems   Respiratory: Positive for chest tightness and shortness of breath.    Cardiovascular: Negative for leg swelling.   Gastrointestinal: Negative for abdominal pain and vomiting.   All other systems reviewed and are negative.        Allergies:  Percocet    Medications:  Prednisone     Past Medical History:     Asthma      Family History:    Mother: hypertension, asthma  Father: hypertension     Social History:  The patient presents to the ED alone.   PCP: No Ref-Primary, Physician     Physical Exam     Patient Vitals for the past 24 hrs:   BP Temp Temp src Pulse Resp SpO2 Height   11/15/22 2053 (!) 131/94 98.4  F (36.9  C) Oral 104 22 98 % 1.803 m (5' 11\")     Physical Exam  Constitutional:       General: She is not in acute distress.     Appearance: She is not diaphoretic.   HENT:      Head: Atraumatic.      Mouth/Throat:      Pharynx: No oropharyngeal exudate.   Eyes:      General: No scleral icterus.     Pupils: Pupils are equal, round, and reactive to light.   Cardiovascular:      Rate and Rhythm: Regular rhythm. Tachycardia present.      Heart sounds: Normal heart " sounds.   Pulmonary:      Effort: No respiratory distress.      Breath sounds: Normal breath sounds.   Abdominal:      Palpations: Abdomen is soft.      Tenderness: There is no abdominal tenderness.   Musculoskeletal:         General: No tenderness.      Right lower leg: No tenderness. No edema.      Left lower leg: No tenderness. No edema.   Skin:     General: Skin is warm.      Capillary Refill: Capillary refill takes less than 2 seconds.      Findings: No rash.   Neurological:      General: No focal deficit present.      Mental Status: She is oriented to person, place, and time.   Psychiatric:         Mood and Affect: Mood normal.         Behavior: Behavior normal.           Emergency Department Course   ECG  ECG results from 11/16/22   EKG 12 lead     Value    Systolic Blood Pressure     Diastolic Blood Pressure     Ventricular Rate 105    Atrial Rate 105    NE Interval 174    QRS Duration 94        QTc 457    P Axis 74    R AXIS 69    T Axis 36    Interpretation ECG      Sinus tachycardia  Possible Left atrial enlargement  Nonspecific T wave abnormality  Abnormal ECG  When compared with ECG of 09-NOV-2022 21:54,  Nonspecific T wave abnormality has replaced inverted T waves in Inferior leads  T wave inversion no longer evident in Anterior leads  T wave amplitude has decreased in Lateral leads       Imaging:  CT Chest Pulmonary Embolism w Contrast    (Results Pending)     Report per radiology    Laboratory:  Labs Ordered and Resulted from Time of ED Arrival to Time of ED Departure   BASIC METABOLIC PANEL - Abnormal       Result Value    Sodium 136      Potassium 3.2 (*)     Chloride 106      Carbon Dioxide (CO2) 24      Anion Gap 6      Urea Nitrogen 13      Creatinine 0.85      Calcium 8.7      Glucose 112 (*)     GFR Estimate >90     CBC WITH PLATELETS AND DIFFERENTIAL - Abnormal    WBC Count 12.4 (*)     RBC Count 4.33      Hemoglobin 11.7      Hematocrit 35.3      MCV 82      MCH 27.0      MCHC 33.1       RDW 14.8      Platelet Count 380      % Neutrophils 78      % Lymphocytes 12      % Monocytes 8      % Eosinophils 1      % Basophils 0      % Immature Granulocytes 1      NRBCs per 100 WBC 0      Absolute Neutrophils 9.7 (*)     Absolute Lymphocytes 1.5      Absolute Monocytes 1.0      Absolute Eosinophils 0.1      Absolute Basophils 0.0      Absolute Immature Granulocytes 0.1      Absolute NRBCs 0.0     INFLUENZA A/B & SARS-COV2 PCR MULTIPLEX - Normal    Influenza A PCR Negative      Influenza B PCR Negative      RSV PCR Negative      SARS CoV2 PCR Negative     TROPONIN I - Normal    Troponin I High Sensitivity 7        Emergency Department Course:     Reviewed:  I reviewed nursing notes, vitals and past medical history    Assessments:  0105 I obtained history and examined the patient as noted above.   0210 I rechecked the patient and explained findings.     Interventions:  0127 Klor-con 20 mEq IV   0127 Solu-medrol 125 mg IV   0128 Ipratropium- albuterol 3mL DUONEB       Impression & Plan   Medical Decision Making:  This patient was recently mated to the hospital with an asthma exacerbation in the setting of an influenza infection.  She complains of increasing shortness of breath today.  On my evaluation however she is not hypoxic or in respiratory distress.  No wheezing.  She was resting comfortably.    Given her recent hospitalization and the slight tachycardia we will get a CT scan to look for possible PE.  As long as this is negative the patient is appropriate for outpatient management.  EKG does not show ischemic changes and troponin is negative today.    The patient will be placed on a longer steroid taper which will be helpful.  She is to follow-up with her physician in the short-term.  Albuterol was refilled.    Diagnosis:    ICD-10-CM    1. Exacerbation of persistent asthma, unspecified asthma severity  J45.901           Discharge Medications:  New Prescriptions    ALBUTEROL (PROVENTIL) (2.5 MG/3ML)  0.083% NEB SOLUTION    Take 1 vial (2.5 mg) by nebulization every 6 hours as needed for shortness of breath / dyspnea or wheezing    PREDNISONE (DELTASONE) 10 MG TABLET    Take 4 tablets daily for 5 days,  take 2 tablets daily for 3 days, take 1 tablet daily for 3 days, take half a tablet for 3 days.     Scribe Disclosure:  I, Sanjeev Dubon, am serving as a scribe at 1:09 AM on 11/16/2022 to document services personally performed by Kevon Patel MD based on my observations and the provider's statements to me.            Kevon Patel MD  11/16/22 [id number]

## 2022-11-16 NOTE — ED TRIAGE NOTES
States recently admitted for influenza last Saturday reports worsening SOB tonight. States using albuterol inhaler without relief. RR 24 SpO2 98% on RA.      Triage Assessment     Row Name 11/15/22 2050       Triage Assessment (Adult)    Airway WDL X    Additional Documentation Breath Sounds (Group)       Respiratory WDL    Respiratory WDL cough    Cough Frequency frequent       Skin Circulation/Temperature WDL    Skin Circulation/Temperature WDL WDL       Cardiac WDL    Cardiac WDL WDL       Peripheral/Neurovascular WDL    Peripheral Neurovascular WDL WDL       Cognitive/Neuro/Behavioral WDL    Cognitive/Neuro/Behavioral WDL WDL

## 2022-11-16 NOTE — DISCHARGE INSTRUCTIONS
Follow-up with your physician in 2 days.    Return to the ER for increased difficulty breathing or any new concerns.

## 2022-11-16 NOTE — ED PROVIDER NOTES
CT Chest Pulmonary Embolism w Contrast   Final Result   IMPRESSION:   1.  Tree-in-bud and more consolidated opacities involving the right middle lobe bilateral lower lobes and to a lesser degree the upper lobes, suspicious for multifocal infection.   2.  No evidence of pulmonary embolus.        Care was signed out to me by Dr. Patel.  Please see his initial ED provider note regarding history of present illness, review of systems, physical exam, and medical decision making.  In brief patient is a 27-year-old female with a history of asthma who presents with persistent shortness of breath after recent hospitalization secondary to influenza.  CT imaging reassuringly shows no evidence of pulmonary embolism but there were multiple tree-in-bud and more consolidative opacities involving the right middle lobe and bilateral lower lobes and upper lobes.  This is suspicious for potential multifocal infection and I do have concern for potential bacterial infection superimposed on potential viral pneumonia from influenza.  She does have a mild leukocytosis.  We will plan to treat with a course of outpatient antibiotics with Augmentin and doxycycline for the next 7 days.  Plan for close outpatient follow-up with her primary care provider in the next 1 week.  Return precautions understood and after all questions answered, discharged home.      ICD-10-CM    1. Community acquired pneumonia, unspecified laterality  J18.9       2. Exacerbation of persistent asthma, unspecified asthma severity  J45.901              Randy Tang MD  11/16/22 0328